# Patient Record
Sex: MALE | Race: WHITE | NOT HISPANIC OR LATINO | Employment: FULL TIME | URBAN - METROPOLITAN AREA
[De-identification: names, ages, dates, MRNs, and addresses within clinical notes are randomized per-mention and may not be internally consistent; named-entity substitution may affect disease eponyms.]

---

## 2018-11-27 DIAGNOSIS — B00.9 HERPES SIMPLEX VIRUS INFECTION: ICD-10-CM

## 2018-11-27 NOTE — TELEPHONE ENCOUNTER
"Requested Prescriptions   Pending Prescriptions Disp Refills     valACYclovir (VALTREX) 500 MG tablet  Last Written Prescription Date:  5/27/16  Last Fill Quantity: 90,  # refills: 0   Last office visit: 9/15/2015 with prescribing provider:  Jackie   Future Office Visit: none     90 tablet 0     Sig: Take 1 tablet (500 mg) by mouth daily    Antivirals for Herpes Protocol Failed    11/27/2018  5:06 PM       Failed - Normal serum creatinine on file in past 12 months    Recent Labs   Lab Test  08/15/14   1146   CR  0.88            Passed - Patient is age 12 or older       Passed - Recent (12 mo) or future (30 days) visit within the authorizing provider's specialty    Patient had office visit in the last 12 months or has a visit in the next 30 days with authorizing provider or within the authorizing provider's specialty.  See \"Patient Info\" tab in inbasket, or \"Choose Columns\" in Meds & Orders section of the refill encounter.                "

## 2018-11-29 RX ORDER — VALACYCLOVIR HYDROCHLORIDE 500 MG/1
500 TABLET, FILM COATED ORAL DAILY
Qty: 90 TABLET | Refills: 0 | Status: SHIPPED | OUTPATIENT
Start: 2018-11-29 | End: 2018-11-30

## 2018-11-29 NOTE — TELEPHONE ENCOUNTER
Routing refill request to provider for review/approval because:  Labs not current:  creat  Patient needs to be seen because it has been more than 1 year since last office visit.- last seen 2015

## 2018-11-30 ENCOUNTER — OFFICE VISIT (OUTPATIENT)
Dept: INTERNAL MEDICINE | Facility: CLINIC | Age: 23
End: 2018-11-30
Payer: COMMERCIAL

## 2018-11-30 VITALS
DIASTOLIC BLOOD PRESSURE: 70 MMHG | HEIGHT: 69 IN | TEMPERATURE: 97.9 F | SYSTOLIC BLOOD PRESSURE: 100 MMHG | RESPIRATION RATE: 18 BRPM | BODY MASS INDEX: 31.12 KG/M2 | WEIGHT: 210.1 LBS

## 2018-11-30 DIAGNOSIS — Z11.3 SCREEN FOR STD (SEXUALLY TRANSMITTED DISEASE): ICD-10-CM

## 2018-11-30 DIAGNOSIS — B00.9 HERPES SIMPLEX VIRUS INFECTION: Primary | ICD-10-CM

## 2018-11-30 DIAGNOSIS — Z23 NEED FOR TD VACCINE: ICD-10-CM

## 2018-11-30 PROCEDURE — 90471 IMMUNIZATION ADMIN: CPT | Performed by: INTERNAL MEDICINE

## 2018-11-30 PROCEDURE — 99202 OFFICE O/P NEW SF 15 MIN: CPT | Mod: 25 | Performed by: INTERNAL MEDICINE

## 2018-11-30 PROCEDURE — 90714 TD VACC NO PRESV 7 YRS+ IM: CPT | Performed by: INTERNAL MEDICINE

## 2018-11-30 PROCEDURE — 87591 N.GONORRHOEAE DNA AMP PROB: CPT | Performed by: INTERNAL MEDICINE

## 2018-11-30 PROCEDURE — 87491 CHLMYD TRACH DNA AMP PROBE: CPT | Performed by: INTERNAL MEDICINE

## 2018-11-30 RX ORDER — VALACYCLOVIR HYDROCHLORIDE 500 MG/1
500 TABLET, FILM COATED ORAL DAILY
Qty: 90 TABLET | Refills: 3 | Status: SHIPPED | OUTPATIENT
Start: 2018-11-30 | End: 2019-12-07

## 2018-11-30 RX ORDER — VALACYCLOVIR HYDROCHLORIDE 1 G/1
1000 TABLET, FILM COATED ORAL DAILY
Qty: 90 TABLET | Refills: 3 | Status: CANCELLED | OUTPATIENT
Start: 2018-11-30

## 2018-11-30 NOTE — NURSING NOTE
"Vital signs:  Temp: 97.9  F (36.6  C) Temp src: Oral BP: 100/70     Resp: 18       Height: 5' 8.5\" (174 cm) Weight: 210 lb 1.6 oz (95.3 kg)  Estimated body mass index is 31.48 kg/(m^2) as calculated from the following:    Height as of this encounter: 5' 8.5\" (1.74 m).    Weight as of this encounter: 210 lb 1.6 oz (95.3 kg).          "

## 2018-11-30 NOTE — PROGRESS NOTES
"  SUBJECTIVE:   Sage Aburto is a 22 year old male who presents to clinic today for the following health issues:      Follow-up for refill of Valtrex for genital herpes.  He reports that he did stop the medication for a while but did have an outbreak again.  He did not realize we had sent a prescription refill earlier in the week so he is not picked up yet.  He has some questions about the effectiveness of the medication.    No other acute concerns.  He reports improved sleep but he is not using any CPAP    Patient Active Problem List   Diagnosis     CARDIOVASCULAR SCREENING; LDL GOAL LESS THAN 160     DASHAWN (obstructive sleep apnea)     Current Outpatient Prescriptions   Medication Sig Dispense Refill     valACYclovir (VALTREX) 500 MG tablet Take 1 tablet (500 mg) by mouth daily 90 tablet 3      Social History   Substance Use Topics     Smoking status: Never Smoker     Smokeless tobacco: Never Used     Alcohol use Yes      Comment: twice a month             ROS:  negativew    OBJECTIVE:     /70 (BP Location: Right arm, Patient Position: Sitting, Cuff Size: Adult Large)  Temp 97.9  F (36.6  C) (Oral)  Resp 18  Ht 5' 8.5\" (1.74 m)  Wt 210 lb 1.6 oz (95.3 kg)  BMI 31.48 kg/m2  Body mass index is 31.48 kg/(m^2).      Not examined.     ASSESSMENT/PLAN:         1. Herpes simplex virus infection  Continue medication, advised that there is low risk of transmission with chronic use but is important to notify potential sexual partners  - valACYclovir (VALTREX) 500 MG tablet; Take 1 tablet (500 mg) by mouth daily  Dispense: 90 tablet; Refill: 3    2. Screen for STD (sexually transmitted disease)  Testing performed today  - NEISSERIA GONORRHOEA PCR  - CHLAMYDIA TRACHOMATIS PCR    3. Need for Td vaccine  Done  - TD (ADULT, 7+) PRESERVE FREE        Anayeli Mejia MD  [facility]    "

## 2018-11-30 NOTE — MR AVS SNAPSHOT
"              After Visit Summary   2018    Sage Aburto    MRN: [id number]           Patient Information     Date Of Birth          1995        Visit Information        Provider Department      2018 9:20 AM Anayeli Mejia MD [facility]        Today's Diagnoses     Herpes simplex virus infection    -  1    Screen for STD (sexually transmitted disease)           Follow-ups after your visit        Who to contact     If you have questions or need follow up information about today's clinic visit or your schedule please contact [facility] directly at [phone].  Normal or non-critical lab and imaging results will be communicated to you by [employer], letter or phone within 4 business days after the clinic has received the results. If you do not hear from us within 7 days, please contact the clinic through [employer] or phone. If you have a critical or abnormal lab result, we will notify you by phone as soon as possible.  Submit refill requests through [employer] or call your pharmacy and they will forward the refill request to us. Please allow 3 business days for your refill to be completed.          Additional Information About Your Visit        MyChart Information     [employer] lets you send messages to your doctor, view your test results, renew your prescriptions, schedule appointments and more. To sign up, go to [email or web address] . Click on \"Log in\" on the left side of the screen, which will take you to the Welcome page. Then click on \"Sign up Now\" on the right side of the page.     You will be asked to enter the access code listed below, as well as some personal information. Please follow the directions to create your username and password.     Your access code is: [email or web address]  Expires: 2019  9:15 AM     Your access code will  in 90 days. If you need help or a new code, please call your [facility] or [phone].        Care EveryWhere ID     " "This is your Care EveryWhere ID. This could be used by other organizations to access your [place] medical records  [id number]        Your Vitals Were     Temperature Respirations Height BMI (Body Mass Index)          97.9  F (36.6  C) (Oral) 18 5' 8.5\" (1.74 m) 31.48 kg/m2         Blood Pressure from Last 3 Encounters:   11/30/18 100/70   08/29/16 125/73   09/15/15 114/62    Weight from Last 3 Encounters:   11/30/18 210 lb 1.6 oz (95.3 kg)   08/29/16 199 lb (90.3 kg)   09/15/15 183 lb (83 kg)              We Performed the Following     CHLAMYDIA TRACHOMATIS PCR     NEISSERIA GONORRHOEA PCR          Today's Medication Changes          These changes are accurate as of 11/30/18 10:00 AM.  If you have any questions, ask your nurse or doctor.               These medicines have changed or have updated prescriptions.        Dose/Directions    valACYclovir 500 MG tablet   Commonly known as:  VALTREX   This may have changed:  Another medication with the same name was removed. Continue taking this medication, and follow the directions you see here.   Used for:  Herpes simplex virus infection   Changed by:  Anayeli Mejia MD        Dose:  500 mg   Take 1 tablet (500 mg) by mouth daily   Quantity:  90 tablet   Refills:  3         Stop taking these medicines if you haven't already. Please contact your care team if you have questions.     modafinil 100 MG tablet   Commonly known as:  PROVIGIL   Stopped by:  Anayeli Mejia MD                Where to get your medicines      These medications were sent to [facility]/pharmacy #[id number] - [facility] [address] [facility]  [address] [address] [facility] [address]     Phone:  [phone]     valACYclovir 500 MG tablet                Primary Care Provider Office Phone # Fax #    Anayeli Mejia [phone]       [address] [facility] [address]  [facility] [address]        Equal Access to Services     PACO GRACE AH: Mika Estrada, milad yeager, rosalba isbell " zane piersonbrianmagui la'aate ah. Marlen [facility] [phone].    ATENCIÓN: Si habla radha, tiene a fuentes disposición servicios gratuitos de asistencia lingüística. Funmilayo al [phone].    We comply with applicable federal civil rights laws and [place] laws. We do not discriminate on the basis of race, color, national origin, age, disability, sex, sexual orientation, or gender identity.            Thank you!     Thank you for choosing [facility]  for your care. Our goal is always to provide you with excellent care. Hearing back from our patients is one way we can continue to improve our services. Please take a few minutes to complete the written survey that you may receive in the mail after your visit with us. Thank you!             Your Updated Medication List - Protect others around you: Learn how to safely use, store and throw away your medicines at www.disposemymeds.org.          This list is accurate as of 11/30/18 10:00 AM.  Always use your most recent med list.                   Brand Name Dispense Instructions for use Diagnosis    valACYclovir 500 MG tablet    VALTREX    90 tablet    Take 1 tablet (500 mg) by mouth daily    Herpes simplex virus infection

## 2018-12-02 LAB
C TRACH DNA SPEC QL NAA+PROBE: NEGATIVE
N GONORRHOEA DNA SPEC QL NAA+PROBE: NEGATIVE
SPECIMEN SOURCE: NORMAL
SPECIMEN SOURCE: NORMAL

## 2019-05-07 ENCOUNTER — OFFICE VISIT (OUTPATIENT)
Dept: INTERNAL MEDICINE | Facility: CLINIC | Age: 24
End: 2019-05-07

## 2019-05-07 VITALS
HEIGHT: 69 IN | RESPIRATION RATE: 16 BRPM | DIASTOLIC BLOOD PRESSURE: 78 MMHG | SYSTOLIC BLOOD PRESSURE: 108 MMHG | OXYGEN SATURATION: 98 % | BODY MASS INDEX: 28.73 KG/M2 | HEART RATE: 77 BPM | WEIGHT: 194 LBS | TEMPERATURE: 98.4 F

## 2019-05-07 DIAGNOSIS — H81.11 BENIGN PAROXYSMAL POSITIONAL VERTIGO OF RIGHT EAR: ICD-10-CM

## 2019-05-07 DIAGNOSIS — G43.919 INTRACTABLE MIGRAINE WITHOUT STATUS MIGRAINOSUS, UNSPECIFIED MIGRAINE TYPE: Primary | ICD-10-CM

## 2019-05-07 PROCEDURE — 99214 OFFICE O/P EST MOD 30 MIN: CPT | Performed by: INTERNAL MEDICINE

## 2019-05-07 RX ORDER — METHYLPREDNISOLONE 4 MG
TABLET, DOSE PACK ORAL
Qty: 21 TABLET | Refills: 0 | Status: SHIPPED | OUTPATIENT
Start: 2019-05-07 | End: 2020-01-27

## 2019-05-07 ASSESSMENT — MIFFLIN-ST. JEOR: SCORE: 1847.42

## 2019-05-07 NOTE — NURSING NOTE
"Vital signs:  Temp: 98.4  F (36.9  C) Temp src: Oral BP: 108/78 Pulse: 77   Resp: 16 SpO2: 98 %     Height: 174 cm (5' 8.5\") Weight: 88 kg (194 lb)  Estimated body mass index is 29.07 kg/m  as calculated from the following:    Height as of this encounter: 1.74 m (5' 8.5\").    Weight as of this encounter: 88 kg (194 lb).          "

## 2019-05-07 NOTE — PROGRESS NOTES
SUBJECTIVE:   Sage Aburto is a 23 year old male who presents to clinic today for the following   health issues:      Headache: Is complaining of headaches for the past 2 months approximately.  This started out intermittent, 2-3 times a week, gradually increasing so they have been daily now for the last 3 or 4 weeks.  Headache is not stabbing or throbbing, it is dull but persistent.  It is the worst in the morning, it decreases very slightly after he is up a little while, otherwise its fairly steady intensity.  It is more frontal, more on the left side of the head and around the left ear but can be diffuse.  At worst at 6/10, most of the time 4/10.  One day he noticed some puffiness of the face but that is not been continuing.  It can be behind the eye but otherwise seems deep in the head.  He does have some neck pain.  No hearing change.  No sinus pain, postnasal drainage or purulent rhinorrhea.  He can get some tingling in his right arm but that is an old problem, otherwise no facial numbness or tingling, double vision, slurred speech, gait disturbance, incoordination.  He has not really had issues at all with headaches in the past.  He does not get auras, does not have significant sensitivity to light or sound.  The headaches do not cause nausea.  Ibuprofen 2 to 4 tablets do not help, Tylenol does not help, Excedrin Migraine does not help    He has lost some weight recently, was up to 260 pounds a year ago.  He has been trying hard to lose weight.       He has had occasional vertigo at night that awakens him.  This is about 3-4 times a week, he can have some visual disturbance with the vertigo as well as nausea.  It goes away after about 5 minutes after sitting up.  He seems to do best lying on his left side.  Vertigo does not seem to be really related to the headaches.        Patient Active Problem List   Diagnosis     CARDIOVASCULAR SCREENING; LDL GOAL LESS THAN 160     DASHAWN (obstructive sleep apnea)     "  Current Outpatient Medications   Medication Sig Dispense Refill     valACYclovir (VALTREX) 500 MG tablet Take 1 tablet (500 mg) by mouth daily 90 tablet 3           Reviewed  and updated as needed this visit by clinical staff  Tobacco  Allergies  Meds  Med Hx  Surg Hx  Fam Hx  Soc Hx        Reviewed and updated as needed this visit by Provider             ROS:  No fever, chills, visual disturbance, facial numbness, tingling, weakness, clumsiness, loss of control of bowel or bladder, just tingling in the right arm, not the left, no ear discharge or hearing change, no sinus pain, postnasal drainage, purulent rhinorrhea, sore throat    OBJECTIVE:     /78   Pulse 77   Temp 98.4  F (36.9  C) (Oral)   Resp 16   Ht 1.74 m (5' 8.5\")   Wt 88 kg (194 lb)   SpO2 98%   BMI 29.07 kg/m    Body mass index is 29.07 kg/m .    PERRL, EOMI, fundi benign  CN 2-12 intact  DTRs 2/4  Strength 5/5  Sensation intact  FNF normal  Gait normal  Tandem normal  Romberg negative  Positive Hallpike with the right ear down  Neck with moderate tenderness knots in the muscles, especially more in the left than the right, good range of motion  No cervical adenopathy or thyromegaly        ASSESSMENT/PLAN:             1. Intractable migraine without status migrainosus, unspecified migraine type  Most it is most likely that he has tension type headache triggering intractable migraine.  It does not appear to be due to sinus disease.  There are no neurologic findings that would indicate tumor.  However this is a new problem, it is worse in the morning and so recommend MRI to rule out other underlying causes, without any thing acute on exam, do not feel this is an emergency however advised that if there is seems to be any acute sudden worsening he should present to the ED..  Will start on a Medrol Dosepak for now.  Then consider possible myofascial therapy for the neck, advised on good ergonomics, heat, stretch.  May need to start on " suppressive treatment.  - methylPREDNISolone (MEDROL DOSEPAK) 4 MG tablet therapy pack; Follow Package Directions  Dispense: 21 tablet; Refill: 0  - MR Brain w/o & w Contrast; Future    2. Benign paroxysmal positional vertigo of right ear  Advised this is benign positional vertigo, is not related to the headache.  Given some handouts with exercises he could try at home.          Anayeli Mejia MD  [facility]

## 2019-05-10 ENCOUNTER — HOSPITAL ENCOUNTER (OUTPATIENT)
Dept: MRI IMAGING | Facility: CLINIC | Age: 24
Discharge: HOME | End: 2019-05-10
Attending: INTERNAL MEDICINE | Admitting: INTERNAL MEDICINE
Payer: COMMERCIAL

## 2019-05-10 DIAGNOSIS — G43.919 INTRACTABLE MIGRAINE WITHOUT STATUS MIGRAINOSUS, UNSPECIFIED MIGRAINE TYPE: ICD-10-CM

## 2019-05-10 PROCEDURE — A9585 GADOBUTROL INJECTION: HCPCS | Performed by: INTERNAL MEDICINE

## 2019-05-10 PROCEDURE — 25500064 ZZH RX 255 OP 636: Performed by: INTERNAL MEDICINE

## 2019-05-10 PROCEDURE — 70553 MRI BRAIN STEM W/O & W/DYE: CPT

## 2019-05-10 RX ORDER — GADOBUTROL 604.72 MG/ML
10 INJECTION INTRAVENOUS ONCE
Status: COMPLETED | OUTPATIENT
Start: 2019-05-10 | End: 2019-05-10

## 2019-05-10 RX ADMIN — GADOBUTROL 9 ML: 604.72 INJECTION INTRAVENOUS at 15:40

## 2019-05-16 ENCOUNTER — MYC MEDICAL ADVICE (OUTPATIENT)
Dept: INTERNAL MEDICINE | Facility: CLINIC | Age: 24
End: 2019-05-16

## 2019-05-16 DIAGNOSIS — G43.019 INTRACTABLE MIGRAINE WITHOUT AURA AND WITHOUT STATUS MIGRAINOSUS: Primary | ICD-10-CM

## 2019-05-17 PROBLEM — G43.019 INTRACTABLE MIGRAINE WITHOUT AURA AND WITHOUT STATUS MIGRAINOSUS: Status: ACTIVE | Noted: 2019-05-17

## 2019-05-17 RX ORDER — SUMATRIPTAN 100 MG/1
TABLET, FILM COATED ORAL
Qty: 18 TABLET | Refills: 5 | Status: SHIPPED | OUTPATIENT
Start: 2019-05-17 | End: 2021-12-15 | Stop reason: SINTOL

## 2019-10-02 ENCOUNTER — HEALTH MAINTENANCE LETTER (OUTPATIENT)
Age: 24
End: 2019-10-02

## 2020-01-27 ENCOUNTER — OFFICE VISIT (OUTPATIENT)
Dept: INTERNAL MEDICINE | Facility: CLINIC | Age: 25
End: 2020-01-27

## 2020-01-27 VITALS
HEART RATE: 60 BPM | RESPIRATION RATE: 16 BRPM | HEIGHT: 69 IN | DIASTOLIC BLOOD PRESSURE: 85 MMHG | TEMPERATURE: 98.1 F | WEIGHT: 194.1 LBS | OXYGEN SATURATION: 97 % | BODY MASS INDEX: 28.75 KG/M2 | SYSTOLIC BLOOD PRESSURE: 132 MMHG

## 2020-01-27 DIAGNOSIS — B00.9 HERPES SIMPLEX VIRUS INFECTION: Primary | ICD-10-CM

## 2020-01-27 PROCEDURE — 99214 OFFICE O/P EST MOD 30 MIN: CPT | Mod: 25 | Performed by: INTERNAL MEDICINE

## 2020-01-27 PROCEDURE — 90471 IMMUNIZATION ADMIN: CPT | Performed by: INTERNAL MEDICINE

## 2020-01-27 PROCEDURE — 90682 RIV4 VACC RECOMBINANT DNA IM: CPT | Performed by: INTERNAL MEDICINE

## 2020-01-27 ASSESSMENT — MIFFLIN-ST. JEOR: SCORE: 1842.87

## 2020-01-27 NOTE — NURSING NOTE
"/85 (BP Location: Left arm, Patient Position: Sitting, Cuff Size: Adult Regular)   Pulse 60   Temp 98.1  F (36.7  C) (Oral)   Resp 16   Ht 1.74 m (5' 8.5\")   Wt 88 kg (194 lb 1.6 oz)   SpO2 97%   BMI 29.08 kg/m      "

## 2020-01-27 NOTE — PROGRESS NOTES
"Subjective     Sage Aburto is a 24 year old male who presents to clinic today for the following health issues:    HPI   Is here for follow-up of herpes simplex: He has a lot of questions regarding the virus, transmission.  He has a partner who has not had any known herpes and she has a lot of questions and concerns about this.  He has been on suppressive therapy for a few years and has not had any outbreaks while taking suppression.          Patient Active Problem List   Diagnosis     CARDIOVASCULAR SCREENING; LDL GOAL LESS THAN 160     DASHAWN (obstructive sleep apnea)     Intractable migraine without aura and without status migrainosus     Current Outpatient Medications   Medication Sig Dispense Refill     valACYclovir (VALTREX) 500 MG tablet TAKE 1 TABLET BY MOUTH EVERY DAY 90 tablet 3     SUMAtriptan (IMITREX) 100 MG tablet 1 for migraine, may repeat after 2 hours (Patient not taking: Reported on 1/27/2020) 18 tablet 5      Social History     Tobacco Use     Smoking status: Never Smoker     Smokeless tobacco: Never Used   Substance Use Topics     Alcohol use: Yes     Comment: twice a month     Drug use: No      Reviewed and updated as needed this visit by Provider         Review of Systems   negative      Objective    /85 (BP Location: Left arm, Patient Position: Sitting, Cuff Size: Adult Regular)   Pulse 60   Temp 98.1  F (36.7  C) (Oral)   Resp 16   Ht 1.74 m (5' 8.5\")   Wt 88 kg (194 lb 1.6 oz)   SpO2 97%   BMI 29.08 kg/m    Body mass index is 29.08 kg/m .  Physical Exam     Not examined.           Assessment & Plan     1. Herpes simplex virus infection  Advised about the herpes simplex virus, that the strains are not necessarily limited to just genital or oral, discussed transmission of the virus.  Overall advised he is likely to be fairly low risk since he has been on suppression therapy for some time without having any outbreaks.  Advised it would not be much need to do any further antibody " testing since he had a positive test in the past and has had characteristic lesions in the past.  Advised to try to be open, make sure if he feels any tingling, burning or other sensations near he has had his previous herpes that he abstain from skin contact until he knows whether it truly is an outbreak or not.    27 minutes spent with the patient, >50% of time spent counseling about the herpes simplex viruses, transmission, suppression    No follow-ups on file.    Anayeli Mejia MD  [facility]

## 2021-01-15 ENCOUNTER — HEALTH MAINTENANCE LETTER (OUTPATIENT)
Age: 26
End: 2021-01-15

## 2021-01-22 DIAGNOSIS — B00.9 HERPES SIMPLEX VIRUS INFECTION: ICD-10-CM

## 2021-01-22 RX ORDER — VALACYCLOVIR HYDROCHLORIDE 500 MG/1
TABLET, FILM COATED ORAL
Qty: 90 TABLET | Refills: 3 | Status: SHIPPED | OUTPATIENT
Start: 2021-01-22 | End: 2022-07-29

## 2021-01-22 NOTE — TELEPHONE ENCOUNTER
Pending Prescriptions:                       Disp   Refills    valACYclovir (VALTREX) 500 MG tablet [Phar*90 tab*3        Sig: TAKE 1 TABLET BY MOUTH EVERY DAY    Routing refill request to provider for review/approval because:  Patient fails protocol

## 2021-09-04 ENCOUNTER — HEALTH MAINTENANCE LETTER (OUTPATIENT)
Age: 26
End: 2021-09-04

## 2021-10-12 ENCOUNTER — MYC MEDICAL ADVICE (OUTPATIENT)
Dept: INTERNAL MEDICINE | Facility: CLINIC | Age: 26
End: 2021-10-12

## 2021-10-12 DIAGNOSIS — G47.10 HYPERSOMNOLENCE: Primary | ICD-10-CM

## 2021-10-12 DIAGNOSIS — G47.33 OSA (OBSTRUCTIVE SLEEP APNEA): ICD-10-CM

## 2022-02-16 ASSESSMENT — SLEEP AND FATIGUE QUESTIONNAIRES
HOW LIKELY ARE YOU TO NOD OFF OR FALL ASLEEP WHILE LYING DOWN TO REST IN THE AFTERNOON WHEN CIRCUMSTANCES PERMIT: HIGH CHANCE OF DOZING
HOW LIKELY ARE YOU TO NOD OFF OR FALL ASLEEP WHILE SITTING QUIETLY AFTER LUNCH WITHOUT ALCOHOL: HIGH CHANCE OF DOZING
HOW LIKELY ARE YOU TO NOD OFF OR FALL ASLEEP WHILE SITTING AND TALKING TO SOMEONE: MODERATE CHANCE OF DOZING
HOW LIKELY ARE YOU TO NOD OFF OR FALL ASLEEP IN A CAR, WHILE STOPPED FOR A FEW MINUTES IN TRAFFIC: MODERATE CHANCE OF DOZING
HOW LIKELY ARE YOU TO NOD OFF OR FALL ASLEEP WHILE SITTING AND READING: HIGH CHANCE OF DOZING
HOW LIKELY ARE YOU TO NOD OFF OR FALL ASLEEP WHEN YOU ARE A PASSENGER IN A CAR FOR AN HOUR WITHOUT A BREAK: HIGH CHANCE OF DOZING
HOW LIKELY ARE YOU TO NOD OFF OR FALL ASLEEP WHILE SITTING INACTIVE IN A PUBLIC PLACE: HIGH CHANCE OF DOZING
HOW LIKELY ARE YOU TO NOD OFF OR FALL ASLEEP WHILE WATCHING TV: HIGH CHANCE OF DOZING

## 2022-02-17 ENCOUNTER — VIRTUAL VISIT (OUTPATIENT)
Dept: SLEEP MEDICINE | Facility: CLINIC | Age: 27
End: 2022-02-17
Payer: COMMERCIAL

## 2022-02-17 DIAGNOSIS — G47.33 OSA (OBSTRUCTIVE SLEEP APNEA): ICD-10-CM

## 2022-02-17 DIAGNOSIS — G47.10 HYPERSOMNOLENCE: Primary | ICD-10-CM

## 2022-02-17 PROCEDURE — 99205 OFFICE O/P NEW HI 60 MIN: CPT | Mod: GT | Performed by: PHYSICIAN ASSISTANT

## 2022-02-17 ASSESSMENT — PAIN SCALES - GENERAL: PAINLEVEL: NO PAIN (0)

## 2022-02-17 NOTE — PROGRESS NOTES
Tomas is a 26 year old who is being evaluated via a billable video visit.      How would you like to obtain your AVS? MyChart  If the video visit is dropped, the invitation should be resent by: Text to cell phone: [phone]   Will anyone else be joining your video visit? No    Medication and allergies have been reviewed.   Pt had no vitals to report.       MANDA Mccallum        Video-Visit Details    Type of service:  Video Visit    Video Start Time: 8:31AM    Video End Time:9:05AM    Originating Location (pt. Location): Home    Distant Location (provider location):  [facility] SLEEP [facility]     Platform used for Video Visit: [email or web address]

## 2022-02-17 NOTE — PATIENT INSTRUCTIONS
"          MY TREATMENT INFORMATION FOR SLEEP APNEA-  Sage DEVI Criselda    Am I having a sleep study at a sleep center?  --->Due to normal delays, you will be contacted within 2-4 weeks to schedule  Frequently asked questions:  1. What is Obstructive Sleep Apnea (DASHAWN)? DASHAWN is the most common type of sleep apnea. Apnea means, \"without breath.\"  Apnea is most often caused by narrowing or collapse of the upper airway as muscles relax during sleep.   Almost everyone has occasional apneas. Most people with sleep apnea have had brief interruptions at night frequently for many years.  The severity of sleep apnea is related to how frequent and severe the events are.   2. What are the consequences of DASHAWN? Symptoms include: feeling sleepy during the day, snoring loudly, gasping or stopping of breathing, trouble sleeping, and occasionally morning headaches or heartburn at night.  Sleepiness can be serious and even increase the risk of falling asleep while driving. Other health consequences may include development of high blood pressure and other cardiovascular disease in persons who are susceptible. Untreated DASHAWN  can contribute to heart disease, stroke and diabetes.   3. What are the treatment options? In most situations, sleep apnea is a lifelong disease that must be managed with daily therapy. Medications are not effective for sleep apnea and surgery is generally not considered until other therapies have been tried. Your treatment is your choice . Continuous Positive Airway (CPAP) works right away and is the therapy that is effective in nearly everyone. An oral device to hold your jaw forward is usually the next most reliable option. Other options include postioning devices (to keep you off your back), weight loss, and surgery including a tongue pacing device. There is more detail about some of these options below.  4. Are my sleep studies covered by insurance? Although we will request verification of coverage, we advise you " also check in advance of the study to ensure there is coverage.    Important tips for those choosing CPAP and similar devices   Know your equipment:  CPAP is continuous positive airway pressure that prevents obstructive sleep apnea by keeping the throat from collapsing while you are sleeping. In most cases, the device is  smart  and can slowly self-adjusts if your throat collapses and keeps a record every day of how well you are treated-this information is available to you and your care team.  BPAP is bilevel positive airway pressure that keeps your throat open and also assists each breath with a pressure boost to maintain adequate breathing.  Special kinds of BPAP are used in patients who have inadequate breathing from lung or heart disease. In most cases, the device is  smart  and can slowly self-adjusts to assist breathing. Like CPAP, the device keeps a record of how well you are treated.  Your mask is your connection to the device. You get to choose what feels most comfortable and the staff will help to make sure if fits. Here: are some examples of the different masks that are available:       Key points to remember on your journey with sleep apnea:  1. Sleep study.  PAP devices often need to be adjusted during a sleep study to show that they are effective and adjusted right.  2. Good tips to remember: Try wearing just the mask during a quiet time during the day so your body adapts to wearing it. A humidifier is recommended for comfort in most cases to prevent drying of your nose and throat. Allergy medication from your provider may help you if you are having nasal congestion.  3. Getting settled-in. It takes more than one night for most of us to get used to wearing a mask. Try wearing just the mask during a quiet time during the day so your body adapts to wearing it. A humidifier is recommended for comfort in most cases. Our team will work with you carefully on the first day and will be in contact within 4 days  and again at 2 and 4 weeks for advice and remote device adjustments. Your therapy is evaluated by the device each day.   4. Use it every night. The more you are able to sleep naturally for 7-8 hours, the more likely you will have good sleep and to prevent health risks or symptoms from sleep apnea. Even if you use it 4 hours it helps. Occasionally all of us are unable to use a medical therapy, in sleep apnea, it is not dangerous to miss one night.   5. Communicate. Call our skilled team on the number provided on the first day if your visit for problems that make it difficult to wear the device. Over 2 out of 3 patients can learn to wear the device long-term with help from our team. Remember to call our team or your sleep providers if you are unable to wear the device as we may have other solutions for those who cannot adapt to mask CPAP therapy. It is recommended that you sleep your sleep provider within the first 3 months and yearly after that if you are not having problems.   6. Use it for your health. We encourage use of CPAP masks during daytime quiet periods to allow your face and brain to adapt to the sensation of CPAP so that it will be a more natural sensation to awaken to at night or during naps. This can be very useful during the first few weeks or months of adapting to CPAP though it does not help medically to wear CPAP during wakefulness and  should not be used as a strategy just to meet guidelines.  7. Take care of your equipment. Make sure you clean your mask and tubing using directions every day and that your filter and mask are replaced as recommended or if they are not working.     BESIDES CPAP, WHAT OTHER THERAPIES ARE THERE?    Positioning Device  Positioning devices are generally used when sleep apnea is mild and only occurs on your back.This example shows a pillow that straps around the waist. It may be appropriate for those whose sleep study shows milder sleep apnea that occurs primarily when  lying flat on one's back. Preliminary studies have shown benefit but effectiveness at home may need to be verified by a home sleep test. These devices are generally not covered by medical insurance.  Examples of devices that maintain sleeping on the back to prevent snoring and mild sleep apnea.    Belt type body positioner  http://emotion.me.com/    Electronic reminder  http://nightshifttherapy.com/  http://[email or web address].au/      Oral Appliance  What is oral appliance therapy?  An oral appliance device fits on your teeth at night like a retainer used after having braces. The device is made by a specialized dentist and requires several visits over 1-2 months before a manufactured device is made to fit your teeth and is adjusted to prevent your sleep apnea. Once an oral device is working properly, snoring should be improved. A home sleep test may be recommended at that time if to determine whether the sleep apnea is adequately treated.       Some things to remember:  -Oral devices are often, but not always, covered by your medical insurance. Be sure to check with your insurance provider.   -If you are referred for oral therapy, you will be given a list of specialized dentists to consider or you may choose to visit the Web site of the American Academy of Dental Sleep Medicine  -Oral devices are less likely to work if you have severe sleep apnea or are extremely overweight.     More detailed information  An oral appliance is a small acrylic device that fits over the upper and lower teeth  (similar to a retainer or a mouth guard). This device slightly moves jaw forward, which moves the base of the tongue forward, opens the airway, improves breathing for effective treat snoring and obstructive sleep apnea in perhaps 7 out of 10 people .  The best working devices are custom-made by a dental device  after a mold is made of the teeth 1, 2, 3.  When is an oral appliance indicated?  Oral appliance therapy is  recommended as a first-line treatment for patients with primary snoring, mild sleep apnea, and for patients with moderate sleep apnea who prefer appliance therapy to use of CPAP4, 5. Severity of sleep apnea is determined by sleep testing and is based on the number of respiratory events per hour of sleep.   How successful is oral appliance therapy?  The success rate of oral appliance therapy in patients with mild sleep apnea is 75-80% while in patients with moderate sleep apnea it is 50-70%. The chance of success in patients with severe sleep apnea is 40-50%. The research also shows that oral appliances have a beneficial effect on the cardiovascular health of DASHAWN patients at the same magnitude as CPAP therapy7.  Oral appliances should be a second-line treatment in cases of severe sleep apnea, but if not completely successful then a combination therapy utilizing CPAP plus oral appliance therapy may be effective. Oral appliances tend to be effective in a broad range of patients although studies show that the patients who have the highest success are females, younger patients, those with milder disease, and less severe obesity. 3, 6.   Finding a dentist that practices dental sleep medicine  Specific training is available through the American Academy of Dental Sleep Medicine for dentists interested in working in the field of sleep. To find a dentist who is educated in the field of sleep and the use of oral appliances, near you, visit the Web site of the American Academy of Dental Sleep Medicine.    References  1. Shon et al. Objectively measured vs self-reported compliance during oral appliance therapy for sleep-disordered breathing. Chest 2013; 144(5): 7763-1060.  2. Kenzie et al. Objective measurement of compliance during oral appliance therapy for sleep-disordered breathing. Thorax 2013; 68(1): 91-96.  3. Sebastian et al. Mandibular advancement devices in 620 men and women with DASHAWN and snoring:  tolerability and predictors of treatment success. Chest 2004; 125: 3181-2638.  4. Robles et al. Oral appliances for snoring and DASHAWN: a review. Sleep 2006; 29: 244-262.  5. Becky et al. Oral appliance treatment for DASHAWN: an update. J Clin Sleep Med 2014; 10(2): 215-227.  6. Eileen et al. Predictors of OSAH treatment outcome. J Dent Res 2007; 86: 2529-9794.      Weight Loss:    Weight loss is a long-term strategy that may improve sleep apnea in some patients.    Weight management is a personal decision and the decision should be based on your interest and the potential benefits.  If you are interested in exploring weight loss strategies, the following discussion covers the impact on weight loss on sleep apnea and the approaches that may be successful.    Being overweight does not necessarily mean you will have health consequences.  Those who have BMI over 35 or over 27 with existing medical conditions carries greater risk.   Weight loss decreases severity of sleep apnea in most people with obesity. For those with mild obesity who have developed snoring with weight gain, even 15-30 pound weight loss can improve and occasionally eliminate sleep apnea.  Structured and life-long dietary and health habits are necessary to lose weight and keep healthier weight levels.     Though there may be significant health benefits from weight loss, long-term weight loss is very difficult to achieve- studies show success with dietary management in less than 10% of people. In addition, substantial weight loss may require years of dietary control and may be difficult if patients have severe obesity. In these cases, surgical management may be considered.  Finally, older individuals who have tolerated obesity without health complications may be less likely to benefit from weight loss strategies.        Your BMI is There is no height or weight on file to calculate BMI.  Weight management is a personal decision.  If you are  interested in exploring weight loss strategies, the following discussion covers the approaches that may be successful. Body mass index (BMI) is one way to tell whether you are at a healthy weight, overweight, or obese. It measures your weight in relation to your height.  A BMI of 18.5 to 24.9 is in the healthy range. A person with a BMI of 25 to 29.9 is considered overweight, and someone with a BMI of 30 or greater is considered obese. More than two-thirds of American adults are considered overweight or obese.  Being overweight or obese increases the risk for further weight gain. Excess weight may lead to heart disease and diabetes.  Creating and following plans for healthy eating and physical activity may help you improve your health.  Weight control is part of healthy lifestyle and includes exercise, emotional health, and healthy eating habits. Careful eating habits lifelong are the mainstay of weight control. Though there are significant health benefits from weight loss, long-term weight loss with diet alone may be very difficult to achieve- studies show long-term success with dietary management in less than 10% of people. Attaining a healthy weight may be especially difficult to achieve in those with severe obesity. In some cases, medications, devices and surgical management might be considered.  What can you do?  If you are overweight or obese and are interested in methods for weight loss, you should discuss this with your provider.     Consider reducing daily calorie intake by 500 calories.     Keep a food journal.     Avoiding skipping meals, consider cutting portions instead.    Diet combined with exercise helps maintain muscle while optimizing fat loss. Strength training is particularly important for building and maintaining muscle mass. Exercise helps reduce stress, increase energy, and improves fitness. Increasing exercise without diet control, however, may not burn enough calories to loose weight.        Start walking three days a week 10-20 minutes at a time    Work towards walking thirty minutes five days a week     Eventually, increase the speed of your walking for 1-2 minutes at time    And look into health and wellness programs that may be available through your health insurance provider, employer, local community center, or siri club.          Surgery:    Surgery for obstructive sleep apnea is considered generally only when other therapies fail to work. Surgery may be discussed with you if you are having a difficult time tolerating CPAP and or when there is an abnormal structure that requires surgical correction.  Nose and throat surgeries often enlarge the airway to prevent collapse.  Most of these surgeries create pain for 1-2 weeks and up to half of the most common surgeries are not effective throughout life.  You should carefully discuss the benefits and drawbacks to surgery with your sleep provider and surgeon to determine if it is the best solution for you.   More information  Surgery for DASHAWN is directed at areas that are responsible for narrowing or complete obstruction of the airway during sleep.  There are a wide range of procedures available to enlarge and/or stabilize the airway to prevent blockage of breathing in the three major areas where it can occur: the palate, tongue, and nasal regions.  Successful surgical treatment depends on the accurate identification of the factors responsible for obstructive sleep apnea in each person.  A personalized approach is required because there is no single treatment that works well for everyone.  Because of anatomic variation, consultation with an examination by a sleep surgeon is a critical first step in determining what surgical options are best for each patient.  In some cases, examination during sedation may be recommended in order to guide the selection of procedures.  Patients will be counseled about risks and benefits as well as the typical  recovery course after surgery. Surgery is typically not a cure for a person s DASHAWN.  However, surgery will often significantly improve one s DASHAWN severity (termed  success rate ).  Even in the absence of a cure, surgery will decrease the cardiovascular risk associated with OSA7; improve overall quality of life8 (sleepiness, functionality, sleep quality, etc).      Palate Procedures:  Patients with DASHAWN often have narrowing of their airway in the region of their tonsils and uvula.  The goals of palate procedures are to widen the airway in this region as well as to help the tissues resist collapse.  Modern palate procedure techniques focus on tissue conservation and soft tissue rearrangement, rather than tissue removal.  Often the uvula is preserved in this procedure. Residual sleep apnea is common in patient after pharyngoplasty with an average reduction in sleep apnea events of 33%2.      Tongue Procedures:  ExamWhile patients are awake, the muscles that surround the throat are active and keep this region open for breathing. These muscles relax during sleep, allowing the tongue and other structures to collapse and block breathing.  There are several different tongue procedures available.  Selection of a tongue base procedure depends on characteristics seen on physical exam.  Generally, procedures are aimed at removing bulky tissues in this area or preventing the back of the tongue from falling back during sleep.  Success rates for tongue surgery range from 50-62%3.    Hypoglossal Nerve Stimulation:  Hypoglossal nerve stimulation has recently received approval from the [place] Food and Drug Administration for the treatment of obstructive sleep apnea.  This is based on research showing that the system was safe and effective in treating sleep apnea6.  Results showed that the median AHI score decreased 68%, from 29.3 to 9.0. This therapy uses an implant system that senses breathing patterns and delivers mild  stimulation to airway muscles, which keeps the airway open during sleep.  The system consists of three fully implanted components: a small generator (similar in size to a pacemaker), a breathing sensor, and a stimulation lead.  Using a small handheld remote, a patient turns the therapy on before bed and off upon awakening.    Candidates for this device must be greater than 22 years of age, have moderate to severe DASHAWN (AHI between 20-65), BMI less than 32, have tried CPAP/oral appliance without success, and have appropriate upper airway anatomy (determined by a sleep endoscopy performed by Dr. Bennett).    Hypoglossal Nerve Stimulation Pathway:    The sleep surgeon s office will work with the patient through the insurance prior-authorization process (including communications and appeals).    Nasal Procedures:  Nasal obstruction can interfere with nasal breathing during the day and night.  Studies have shown that relief of nasal obstruction can improve the ability of some patients to tolerate positive airway pressure therapy for obstructive sleep apnea1.  Treatment options include medications such as nasal saline, topical corticosteroid and antihistamine sprays, and oral medications such as antihistamines or decongestants. Non-surgical treatments can include external nasal dilators for selected patients. If these are not successful by themselves, surgery can improve the nasal airway either alone or in combination with these other options.      Combination Procedures:  Combination of surgical procedures and other treatments may be recommended, particularly if patients have more than one area of narrowing or persistent positional disease.  The success rate of combination surgery ranges from 66-80%2,3.    References  1. Alexx TORRES. The Role of the Nose in Snoring and Obstructive Sleep Apnoea: An Update.  Eur Arch Otorhinolaryngol. 2011; 268: 1365-73.  2.  Eda SM; Rosalba JA; Karlie JR; Pallanch JF; Leandro ROCHE; Misha SG;  Joi LOPEZ. Surgical modifications of the upper airway for obstructive sleep apnea in adults: a systematic review and meta-analysis. SLEEP 2010;33(10):1944-6479. Christy HECK. Hypopharyngeal surgery in obstructive sleep apnea: an evidence-based medicine review.  Arch Otolaryngol Head Neck Surg. 2006 Feb;132(2):206-13.  3. Josué YH1, Dieter Y, [place] JAROD. The efficacy of anatomically based multilevel surgery for obstructive sleep apnea. Otolaryngol Head Neck Surg. 2003 Oct;129(4):327-35.  4. Christy HECK, Goldberg A. Hypopharyngeal Surgery in Obstructive Sleep Apnea: An Evidence-Based Medicine Review. Arch Otolaryngol Head Neck Surg. 2006 Feb;132(2):206-13.  5. Nian PICKARD et al. Upper-Airway Stimulation for Obstructive Sleep Apnea.  N Engl J Med. 2014 Jan 9;370(2):139-49.  6. Ileana Y et al. Increased Incidence of Cardiovascular Disease in Middle-aged Men with Obstructive Sleep Apnea. Am J Respir Crit Care Med; 2002 166: 159-165  7. Moralez EM et al. Studying Life Effects and Effectiveness of Palatopharyngoplasty (SLEEP) study: Subjective Outcomes of Isolated Uvulopalatopharyngoplasty. Otolaryngol Head Neck Surg. 2011; 144: 623-631.        WHAT IF I ONLY HAVE SNORING?      Mandibular advancement devices, lateral sleep positioning, long-term weight loss and treatment of nasal allergies have been shown to improve snoring.    Exercising tongue muscles with a game (https://www.ncbi.nlm.nih.gov/pubmed/23342869) or stimulating the tongue during the day with a device (https://doi.org/10.3390/bkv06728082) have improved snoring in some individuals.    Remember to Drive Safe... Drive Alive     Sleep health profoundly affects your health, mood, and your safety.  Thirty three percent of the population (one in three of us) is not getting enough sleep and many have a sleep disorder. Not getting enough sleep or having an untreated / undertreated sleep condition may make us sleepy without even knowing it. In fact, our driving could be  dramatically impaired due to our sleep health. As your provider, here are some things I would like you to know about driving:     Here are some warning signs for impairment and dangerous drowsy driving:              -Having been awake more than 16 hours               -Looking tired               -Eyelid drooping              -Head nodding (it could be too late at this point)              -Driving for more than 30 minutes     Some things you could do to make the driving safer if you are experiencing some drowsiness:              -Stop driving and rest              -Call for transportation              -Make sure your sleep disorder is adequately treated     Some things that have been shown NOT to work when experiencing drowsiness while driving:              -Turning on the radio              -Opening windows              -Eating any  distracting  /  entertaining  foods (e.g., sunflower seeds, candy, or any other)              -Talking on the phone      Your decision may not only impact your life, but also the life of others. Please, remember to drive safe for yourself and all of us.

## 2022-02-19 ENCOUNTER — HEALTH MAINTENANCE LETTER (OUTPATIENT)
Age: 27
End: 2022-02-19

## 2022-02-23 ENCOUNTER — TELEPHONE (OUTPATIENT)
Dept: SLEEP MEDICINE | Facility: CLINIC | Age: 27
End: 2022-02-23
Payer: COMMERCIAL

## 2022-04-25 ENCOUNTER — OFFICE VISIT (OUTPATIENT)
Dept: SLEEP MEDICINE | Facility: CLINIC | Age: 27
End: 2022-04-25
Payer: COMMERCIAL

## 2022-04-25 DIAGNOSIS — G47.10 HYPERSOMNOLENCE: Primary | ICD-10-CM

## 2022-04-25 NOTE — PROGRESS NOTES
Patient presented to clinic for  and demonstration of the actiwatch and sleep log. Patient was set up and instructed use. Patient verbalized understanding and demonstrated set up back to me. Patient will be returning device by 5/9/2022.    Patient was given sleep logs and written instructions for use.

## 2022-05-08 ENCOUNTER — THERAPY VISIT (OUTPATIENT)
Dept: SLEEP MEDICINE | Facility: CLINIC | Age: 27
End: 2022-05-08
Payer: COMMERCIAL

## 2022-05-08 DIAGNOSIS — G47.33 OSA (OBSTRUCTIVE SLEEP APNEA): ICD-10-CM

## 2022-05-08 DIAGNOSIS — G47.10 HYPERSOMNOLENCE: ICD-10-CM

## 2022-05-08 PROCEDURE — 95810 POLYSOM 6/> YRS 4/> PARAM: CPT | Performed by: PSYCHIATRY & NEUROLOGY

## 2022-05-09 ENCOUNTER — THERAPY VISIT (OUTPATIENT)
Dept: SLEEP MEDICINE | Facility: CLINIC | Age: 27
End: 2022-05-09
Payer: COMMERCIAL

## 2022-05-09 ENCOUNTER — DOCUMENTATION ONLY (OUTPATIENT)
Dept: SLEEP MEDICINE | Facility: CLINIC | Age: 27
End: 2022-05-09

## 2022-05-09 DIAGNOSIS — G47.10 HYPERSOMNOLENCE: ICD-10-CM

## 2022-05-09 LAB
AMPHETAMINES UR QL SCN: NORMAL
BARBITURATES UR QL: NORMAL
BENZODIAZ UR QL: NORMAL
CANNABINOIDS UR QL SCN: NORMAL
COCAINE UR QL: NORMAL
ETHANOL UR QL SCN: NORMAL
OPIATES UR QL SCN: NORMAL
PCP UR QL SCN: NORMAL

## 2022-05-09 PROCEDURE — 80307 DRUG TEST PRSMV CHEM ANLYZR: CPT

## 2022-05-09 PROCEDURE — 95805 MULTIPLE SLEEP LATENCY TEST: CPT | Performed by: PSYCHIATRY & NEUROLOGY

## 2022-05-09 NOTE — PATIENT INSTRUCTIONS
[facility]    1. Your sleep study will be reviewed by a sleep physician within the next few days.     2. Please follow up in the sleep clinic as scheduled, or, make an appointment with your sleep provider to be seen within two weeks to discuss the results of the sleep study.    3. If you have any questions or problems with your treatment plan, please contact your sleep clinic provider at [phone] to further manage your condition.    4. Please review your attached medication list, and, at your follow-up appointment advise your sleep clinic provider about any changes.    5. Go to [email or web address] for more information about your sleep problems.    Anna Hernandez, RPSGT  May 9, 2022

## 2022-05-09 NOTE — PROGRESS NOTES
MSLT study performed after an overnight PSG (  ). Lights on occurred at 0739. Patient describes night as normal.    Nap 1: Patient states that he is tired a 6-7 on a scale of 1-10. Lights out occurred at 0921 in epoch 1. Sleep onset occurred in epoch 32 after 16 minutes. No REM was observed. Patient felt like he slept did not remember dreaming and felt slightly worse than before he the nap started.    Nap 2: Patient states that he feels tired but better than previous nap scoring a 4on a scale of 1-10. Lights out occurred at 1120 in epoch 240. Sleep onset occurred in epoch 244 after 2 minutes.Patient felt like he slept but did not dream he stated that at one point he startled awake and felt dizzy for a moment. No REM was observed. Patient stated he felt slightly better at the end of the nap period.    Nap 3: Patient states that he is not very tired feels like about a 3 on a scale of 1-10. Lights out occurred at 122 in epoch 483. Sleep onset occurred in epoch 487 after 2 minutes. No REM was observed. Patient felt like he blinked and it was done so he believes he slept but did not dream and does feel refreashed after the nap.    Nap 4: Patient states that he is a little tired states a 4-5 out of 10. Lights out occurred at 310 in epoch 699. Sleep onset occurred in epoch 708 after 4.5 minutes. No REM was observed. Patient felt like he fell asleep but did not dream and felt the same as he did before the nap.

## 2022-05-09 NOTE — PROGRESS NOTES
Actigraphy watch and sleep log was reutrned.  Actigraphy report and sleep log was scanned in to Epic.

## 2022-05-12 NOTE — PROCEDURES
" MSLT REPORT          Patient Name: MARYBETH MCDERMOTT Study Date: 5/9/2022   YOB: 1995 Study Type: MSLT   Age:  26 year MRN: [id number]   Sex: Male Interp Physician: noel   BMI:  28.4 Ordering Physician: Leta Warner PA-C   Height: 5' 8\" Referring Physician: -   Weight: 185.0 lbs Recording Tech: R Neppl RPSGT   [place]: 21 Neck Size (cm): 39 cm Scoring Tech: R Neppl RPSGT   Nap Summary       Nap 1 Nap 2 Nap 3 Nap 4 Nap 5 Average   Lights Off 09:21:51 AM 11:20:55 AM 01:22:29 PM 03:10:27 PM - -   Lights On 09:52:53 AM 11:38:42 AM 01:40:14 PM 03:30:34 PM - -   Time In Bed 31.0 17.8 17.8 20.1 - 21.7   Sleep Time 6.5 9.5 15.8 10.0 - 10.5   Sleep Efficiency 20.9% 53.4% 89.2% 49.7% - 48.3%   Sleep Onset 09:36:54 AM 11:22:54 AM 01:24:24 PM 03:14:54 PM - -   Sleep Latency 15.1 2.0 1.9 4.5 - 5.8   REM Onset - - - - - -   REM Sleep Onset Latency - - - - - -         Recording / Sleep Tech Comments    This MSLT was recorded after overnight polysomnography -- Lights On at 0739 (TRT = 595 min, TST = 566 min).  Patient was monitored all day and demonstrated compliance with all technologist s instructions.  Sleep onset detected in study periods 1,2,3 and 4.  SOREMPs were not observed in study periods.  Actiwatch was collected and downloaded.  Random UA was collected.    Physician Interpretation/Recommendation  This study demonstrated a mean sleep latency of < 6 minutes without sleep onset REM (nor was sleep onset REM noted on the prior nights PSG).  This study suggests a possible diagnosis of idiopathic hypersomnia.  Recommend optimizing the duration and circadian timing of sleep and if appropriate pharmacological therapy. Clinical correlation required.     Diagnostic Code G47.9        Torrey Tinajero MD 5-12-22  Diplomate, ABPN Sleep Medicine    "

## 2022-05-13 LAB — SLPCOMP: NORMAL

## 2022-05-23 ENCOUNTER — TELEPHONE (OUTPATIENT)
Dept: SLEEP MEDICINE | Facility: CLINIC | Age: 27
End: 2022-05-23

## 2022-05-23 ENCOUNTER — VIRTUAL VISIT (OUTPATIENT)
Dept: SLEEP MEDICINE | Facility: CLINIC | Age: 27
End: 2022-05-23
Payer: COMMERCIAL

## 2022-05-23 VITALS — WEIGHT: 195 LBS | HEIGHT: 68 IN | BODY MASS INDEX: 29.55 KG/M2

## 2022-05-23 DIAGNOSIS — G47.11 IDIOPATHIC HYPERSOMNOLENCE: Primary | ICD-10-CM

## 2022-05-23 PROCEDURE — 99214 OFFICE O/P EST MOD 30 MIN: CPT | Mod: 95 | Performed by: PHYSICIAN ASSISTANT

## 2022-05-23 RX ORDER — MODAFINIL 200 MG/1
200 TABLET ORAL DAILY
Qty: 30 TABLET | Refills: 0 | Status: SHIPPED | OUTPATIENT
Start: 2022-05-23 | End: 2022-06-15

## 2022-05-23 ASSESSMENT — SLEEP AND FATIGUE QUESTIONNAIRES
HOW LIKELY ARE YOU TO NOD OFF OR FALL ASLEEP IN A CAR, WHILE STOPPED FOR A FEW MINUTES IN TRAFFIC: SLIGHT CHANCE OF DOZING
HOW LIKELY ARE YOU TO NOD OFF OR FALL ASLEEP WHILE WATCHING TV: HIGH CHANCE OF DOZING
HOW LIKELY ARE YOU TO NOD OFF OR FALL ASLEEP WHILE SITTING QUIETLY AFTER LUNCH WITHOUT ALCOHOL: MODERATE CHANCE OF DOZING
HOW LIKELY ARE YOU TO NOD OFF OR FALL ASLEEP WHILE LYING DOWN TO REST IN THE AFTERNOON WHEN CIRCUMSTANCES PERMIT: MODERATE CHANCE OF DOZING
HOW LIKELY ARE YOU TO NOD OFF OR FALL ASLEEP WHILE SITTING AND TALKING TO SOMEONE: MODERATE CHANCE OF DOZING
HOW LIKELY ARE YOU TO NOD OFF OR FALL ASLEEP WHEN YOU ARE A PASSENGER IN A CAR FOR AN HOUR WITHOUT A BREAK: HIGH CHANCE OF DOZING
HOW LIKELY ARE YOU TO NOD OFF OR FALL ASLEEP WHILE SITTING INACTIVE IN A PUBLIC PLACE: HIGH CHANCE OF DOZING
HOW LIKELY ARE YOU TO NOD OFF OR FALL ASLEEP WHILE SITTING AND READING: HIGH CHANCE OF DOZING

## 2022-05-23 NOTE — Clinical Note
Hi! I have a new patient on stimulant (modafinil) that needs to be added to stimulant calendar, he is coming into [facility] later today to sign controlled substance agreement and I sent him 1 month with no refills just now to pharmacy

## 2022-05-23 NOTE — PROGRESS NOTES
Tomas is a 26 year old who is being evaluated via a billable video visit.      How would you like to obtain your AVS? MyChart  If the video visit is dropped, the invitation should be resent by: Send to e-mail at: [email or web address]  Will anyone else be joining your video visit? No      Video-Visit Details    Type of service:  Video Visit    Video Start Time: 9:05AM    Video End Time:9:20AM    Originating Location (pt. Location): Home    Distant Location (provider location):  [facility]     Platform used for Video Visit: [employer]

## 2022-05-23 NOTE — TELEPHONE ENCOUNTER
Prior Authorization Specialty Medication Request    Medication/Dose:  Modafinil 200 mg  ICD code (if different than what is on RX):      Insurance Name: [facility]  Insurance ID: [id number]  Insurance Phone Number:     Pharmacy Information (if different than what is on RX)  Name:  [facility]  Phone:  [phone].      KANDY Huynh

## 2022-05-23 NOTE — PROGRESS NOTES
"[facility]   Outpatient Sleep Medicine  May 23, 2022       Name: Sage Aburto MRN# [id number]   Age: 26 year old YOB: 1995          Assessment and Plan:   1. Idiopathic hypersomnolence    During this visit, we reviewed hypersomnolence testing including 2 weeks actigraphy, PSG, MSLT, urine tox results.     Pre-PSG evaluation showed relatively consistent sleep intake of approximately 7-8 hours from 10:30-11PM to roughly 6:30AM.     PSG was unrevealing. No evidence of significant sleep disordered breathing with AHI 2.0, RDI 3.5.  No sustained hypoxemia.  No evidence of sleep-related movement disorder, few PLM's with index 4.2, arousal at 1.9 but likely insignificant. Normal sinus rhythm on cardiac monitoring.  Sleep architecture revealed all sleep stages present with normal duration and normal sleep efficiency 95%.  Sleep latency was decreased at 5 minutes.  REM latency 143.5 minutes.    MSLT completed following day consisted of 4 naps.  Mean sleep latency during these naps 5.8 minutes (nap one 15.1 minutes, nap two 2 minutes, nap three 1.9 minute, nap four 4.5 minutes).  No sleep onset REM.    Urine drug screen negative.     Findings of testing favor diagnosis of idiopathic hypersomnolence. Discussed stimulant therapy is general management strategy to assist with controlling daytime sleepiness. Was on modafinil in the past prescribed by Bennett Goltz PAC and tolerated well, recalls \"it was awesome for me\". Will re-start modafinil 200mg daily in the morning. He will come into clinic to sign controlled substance agreement.     Follow-up in 1-2 months for medication follow-up to ensure modafinil is tolerated and effective.        Chief Complaint      Chief Complaint   Patient presents with     Video Visit     PSG/MSLT follow-up          History of Present Illness:   Sage Aburto is a 26 year old male who presents to the clinic for results of recent sleep testing completed on " 5/8/2022 and 5/9/2022 to evaluate hypersomnia.    Reviewed results of sleep study with patient as follows:   DIAGNOSTIC POLYSOMNOGRAPHY REPORT   Sleep Architecture: Sleep fragmentation. Normal REM latency.   The total recording time of the polysomnogram was 595.5 minutes. The total sleep time was 566.0 minutes. Sleep latency was 5.0 minutes. REM latency was 143.5 minutes. Arousal index was 18.7 arousals per hour. Sleep efficiency was 95.0%. Wake after sleep onset was 24.5 minutes. The patient spent 4.6% of total sleep time in Stage N1, 46.5% in Stage N2, 28.3% in Stage N3, and 20.7% in REM. Time in REM supine was 102.0 minutes.     Respiration: This study did not demonstrate clinically significant sleep disordered breathing.  This was a good study that included REM supine.      Events ? The polysomnogram revealed a presence of - obstructive, 3 central, and - mixed apneas resulting in an apnea index of 0.3 events per hour. There were 16 obstructive hypopneas and - central hypopneas resulting in an obstructive hypopnea index of 1.7 and central hypopnea index of - events per hour. The combined apnea/hypopnea index was 2.0 events per hour (central apnea/hypopnea index was 0.3 events per hour). The REM AHI was 3.1 events per hour. The supine AHI was 3.1 events per hour. The RERA index was 1.5 events per hour.  The RDI was 3.5 events per hour.    Snoring - was reported as minimal.    Respiratory rate and pattern - was notable for normal respiratory rate and pattern.    Sustained Sleep Associated Hypoventilation - Transcutaneous carbon dioxide monitoring was not used.    Sleep Associated Hypoxemia - (Greater than 5 minutes O2 sat at or below 88%) was not present. Baseline oxygen saturation was 93.4%. Lowest oxygen saturation was 85.0%. Time spent less than or equal to 88% was 0.3 minutes. Time spent less than or equal to 89% was 0.5 minutes.     Movement Activity: No evidence of sleep related movement disorder.  "    Periodic Limb Activity - There were 40 PLMs during the entire study. The PLM index was 4.2 movements per hour. The PLM Arousal Index was 1.9 per hour.    REM EMG Activity - Excessive muscle activity was not present.    Nocturnal Behavior - Abnormal sleep related behaviors were not noted.    Bruxism - None apparent.     Cardiac Summary: Sinus  The average pulse rate was 53.8 bpm. The minimum pulse rate was 43.0 bpm while the maximum pulse rate was 95.0 bpm.  Arrhythmias were/were not noted.    MSLT Report:  Physician Interpretation/Recommendation  This study demonstrated a mean sleep latency of < 6 minutes without sleep onset REM (nor was sleep onset REM noted on the prior nights PSG).  This study suggests a possible diagnosis of idiopathic hypersomnia.  Recommend optimizing the duration and circadian timing of sleep and if appropriate pharmacological therapy. Clinical correlation required.     Past medical/surgical history, family history, social history, medications and allergies were reviewed.           Physical Examination:   Ht 1.727 m (5' 8\")   Wt 88.5 kg (195 lb)   BMI 29.65 kg/m    General appearance: Awake, alert, cooperative. Well groomed. Sitting comfortably in chair. In no apparent distress.  HEENT: Head: Normocephalic, atraumatic. Eyes:Conjunctiva clear. Sclera normal. Nose: External appearance without deformity.   Pulmonary:  Able to speak easily in full sentences. No cough or wheeze.   Skin:  No rashes or significant lesions on visible skin.   Neurologic: Alert, oriented x3.   Psychiatric: Mood euthymic. Affect congruent with full range and intensity.      CC:  Anayeli Mejia PA-C  May 23, 2022     [facility]  [address] [place] [address]     [facility]  [address] [address]    Chart documentation was completed, in part, with [employer] voice-recognition software. Even though reviewed, some " grammatical, spelling, and word errors may remain.      30 minutes spent on day of encounter doing chart review, history and exam, documentation, and further activities as noted above

## 2022-05-26 NOTE — TELEPHONE ENCOUNTER
PA Initiation    Medication: modafinil (PROVIGIL) 200 MG tablet   Insurance Company: Other (see comments)  Pharmacy Filling the Rx: [id number] - [place], [place] - [address]  Filling Pharmacy Phone: [phone]  Filling Pharmacy Fax: [phone]  Start Date: 5/26/2022

## 2022-05-26 NOTE — TELEPHONE ENCOUNTER
Prior Authorization Approval    Authorization Effective Date: 5/26/2022  Authorization Expiration Date: 5/25/2023  Medication: modafinil (PROVIGIL) 200 MG tablet--APPROVED  Approved Dose/Quantity:   Reference #: [id number]   Insurance Company: Other (see comments)  Expected CoPay:       CoPay Card Available:      Foundation Assistance Needed:    Which Pharmacy is filling the prescription (Not needed for infusion/clinic administered): [id number] - [place], [place] - [address]  Pharmacy Notified: Yes  Patient Notified: Yes **Instructed pharmacy to notify patient when script is ready to /ship.**

## 2022-06-15 RX ORDER — MODAFINIL 200 MG/1
200 TABLET ORAL DAILY
Qty: 30 TABLET | Refills: 0 | Status: SHIPPED | OUTPATIENT
Start: 2022-07-25 | End: 2022-09-08

## 2022-06-15 RX ORDER — MODAFINIL 200 MG/1
200 TABLET ORAL DAILY
Qty: 30 TABLET | Refills: 0 | Status: SHIPPED | OUTPATIENT
Start: 2022-08-24 | End: 2022-09-08

## 2022-06-15 RX ORDER — MODAFINIL 200 MG/1
200 TABLET ORAL DAILY
Qty: 30 TABLET | Refills: 0 | Status: SHIPPED | OUTPATIENT
Start: 2022-06-25 | End: 2022-09-08

## 2022-06-15 NOTE — TELEPHONE ENCOUNTER
Pending Prescriptions:                       Disp   Refills    modafinil (PROVIGIL) 200 MG tablet        30 tab*0            Sig: Take 1 tablet (200 mg) by mouth daily    modafinil (PROVIGIL) 200 MG tablet        30 tab*0            Sig: Take 1 tablet (200 mg) by mouth daily    modafinil (PROVIGIL) 200 MG tablet        30 tab*0            Sig: Take 1 tablet (200 mg) by mouth daily          Last Written Prescription Date:  05/26/2022  Last Fill Quantity: 30,   # refills: 0  Last Office Visit: 05/23/2022  Future Office visit:   07/20/2022    Routing refill request to provider for review/approval because:  Drug not on the G, P or [facility] refill protocol or controlled substance

## 2022-07-20 ENCOUNTER — OFFICE VISIT (OUTPATIENT)
Dept: SLEEP MEDICINE | Facility: CLINIC | Age: 27
End: 2022-07-20
Payer: COMMERCIAL

## 2022-07-20 VITALS
SYSTOLIC BLOOD PRESSURE: 133 MMHG | DIASTOLIC BLOOD PRESSURE: 85 MMHG | RESPIRATION RATE: 16 BRPM | OXYGEN SATURATION: 97 % | HEART RATE: 58 BPM | HEIGHT: 68 IN | BODY MASS INDEX: 31.01 KG/M2 | WEIGHT: 204.6 LBS

## 2022-07-20 DIAGNOSIS — G47.11 IDIOPATHIC HYPERSOMNOLENCE: Primary | ICD-10-CM

## 2022-07-20 PROCEDURE — 99214 OFFICE O/P EST MOD 30 MIN: CPT | Performed by: PHYSICIAN ASSISTANT

## 2022-07-20 RX ORDER — MODAFINIL 100 MG/1
100 TABLET ORAL
Qty: 30 TABLET | Refills: 0 | Status: SHIPPED | OUTPATIENT
Start: 2022-07-20 | End: 2022-09-08

## 2022-07-20 NOTE — PROGRESS NOTES
[facility]   Outpatient Sleep Medicine  Jul 20, 2022       Name: Sage Aburto MRN# [id number]   Age: 26 year old YOB: 1995            Assessment and Plan:   1. Idiopathic hypersomnolence  Improvement in sleepiness on modafinil 200mg, overall happy with results but ESS still elevated at 17/24. Discussed options today and agreed to trial of additional 100mg tablet to be taken in late morning/early afternoon. Will let me know how tolerates/any side effects. Plan to see him back in 1-2 months to recheck progress on increased modafinil dose.        Chief Complaint      Chief Complaint   Patient presents with     Sleep Problem     Medication follow up           History of Present Illness:   Sage Aburto is a 26 year old male who presents to the clinic for follow-up of idiopathic hypersomnolence treated with modafinil 200mg.     Summary of hypersomnolence testing completed 5/2022:  Pre-PSG evaluation showed relatively consistent sleep intake of approximately 7-8 hours from 10:30-11:00PM to roughly 6:30AM.      PSG was unrevealing. No evidence of significant sleep disordered breathing with AHI 2.0, RDI 3.5.  No sustained hypoxemia.  No evidence of sleep-related movement disorder, few PLM's with index 4.2, arousal at 1.9. Normal sinus rhythm on cardiac monitoring.  Sleep architecture revealed all sleep stages present with normal duration and normal sleep efficiency 95%.  Sleep latency was decreased at 5 minutes.  REM latency 143.5 minutes.     MSLT completed following day consisted of 4 naps.  Mean sleep latency during these naps 5.8 minutes (nap one 15.1 minutes, nap two 2 minutes, nap three 1.9 minute, nap four 4.5 minutes).  No sleep onset REM.     Urine drug screen negative.     Following this testing re-started on modafinil 200mg. Was previously prescribed modafinil by Bennett Goltz PAC in the past for sleepiness with DASHAWN and tolerated well.     Feels medication is working  "well, felt a distinct change in sleepiness right after starting but now feels efficacy is no longer as obvious, but still improved. Feels \"mental clarity\" on it was significant at first, not so much now. No longer falling asleep in social settings. Cut down significantly on caffeine, now 2 cups coffee per day. Reports some headaches but not daily and not significant. No insomnia, GI concerns, palpitations/tachycardia etc.     Sleep schedule is the same all days of the week with bedtime between 9-10:30PM with <10 minute sleep latency and are typically getting out of bed at 5:30-6:30AM. No nighttime awakenings. Taking the modafinil around 6-6:30AM.     Taking virtually every day, has been a couple days either on a Sunday or on a weekend where he knows he will be drinking alcohol that he does not take it because does not want to mix.     SCALES:    SLEEPINESS: today ESS 17, previously 21/24   INSOMNIA: today VICENTE is 10, previously 19/28    Past medical/surgical history, family history, social history, medications and allergies were reviewed.           Physical Examination:   /85   Pulse 58   Resp 16   Ht 1.727 m (5' 8\")   Wt 92.8 kg (204 lb 9.6 oz)   SpO2 97%   BMI 31.11 kg/m    General appearance: Awake, alert, cooperative. Well groomed. Sitting comfortably in chair. In no apparent distress.  HEENT: Head: Normocephalic, atraumatic. Eyes:Conjunctiva clear. Sclera normal. Remainder of face covered by mask.  Pulmonary:  Able to speak easily in full sentences. No cough or wheeze.   Skin:  No rashes or significant lesions on visible skin.   Neurologic: Alert, oriented x3.   Psychiatric: Mood euthymic. Affect congruent with full range and intensity.    Leta Warner PA-C  Jul 20, 2022      [facility]  [address]  [address], [place] [address]     [facility]  [address], [place] [address]    Chart documentation was completed, in " part, with Dragon voice-recognition software. Even though reviewed, some grammatical, spelling, and word errors may remain.    32 minutes spent on day of encounter doing chart review, history and exam, documentation, and further activities as noted above

## 2022-07-20 NOTE — Clinical Note
Hi! I think I send to you for updates to stimulant calendar right? Tell me if I'm wrong though. For Tomas we will be increasing modafinil to 300mg/day - taking his already prescribed 200mg in AM but adding new script of 100mg to be taken with lunch. Plan to see him back in 1-2 months to re-check.

## 2022-07-20 NOTE — PATIENT INSTRUCTIONS

## 2022-07-20 NOTE — NURSING NOTE
"Chief Complaint   Patient presents with     Sleep Problem     Medication follow up        Initial /85   Pulse 58   Resp 16   Ht 1.727 m (5' 8\")   Wt 92.8 kg (204 lb 9.6 oz)   SpO2 97%   BMI 31.11 kg/m   Estimated body mass index is 31.11 kg/m  as calculated from the following:    Height as of this encounter: 1.727 m (5' 8\").    Weight as of this encounter: 92.8 kg (204 lb 9.6 oz).    Medication Reconciliation: complete    ESS 17  VICENTE 10  Daxa Miranda MA    "

## 2022-07-22 ENCOUNTER — TELEPHONE (OUTPATIENT)
Dept: SLEEP MEDICINE | Facility: CLINIC | Age: 27
End: 2022-07-22

## 2022-07-22 NOTE — TELEPHONE ENCOUNTER
Prior Authorization Specialty Medication Request    Medication/Dose: Modafinil  ICD code (if different than what is on RX):      Insurance Name: [facility]  Insurance ID: [id number]  Insurance Phone Number:     Pharmacy Information (if different than what is on RX)  Name:  [facility]  Phone:  [phone].    KANDY Huynh

## 2022-07-26 NOTE — TELEPHONE ENCOUNTER
Central Prior Authorization Team   Phone: [phone]    PA Initiation    Medication:   Insurance Company:    Pharmacy Filling the Rx: [id number] - [place], [place] - [address]  Filling Pharmacy Phone: [phone]  Filling Pharmacy Fax: [phone]  Start Date: 7/26/2022

## 2022-07-27 NOTE — TELEPHONE ENCOUNTER
Prior Authorization Approval    Authorization Effective Date: 7/26/2022  Authorization Expiration Date: 7/25/2023  Medication: Modafinil - APPROVED  Approved Dose/Quantity:    Reference #: [id number]   Insurance Company:    Expected CoPay:       CoPay Card Available:      Foundation Assistance Needed:    Which Pharmacy is filling the prescription (Not needed for infusion/clinic administered): [id number] [place], [place] [facility] [address]  Pharmacy Notified: Yes  Patient Notified: Yes  PHARMACY STATES REFILLS ARE NEEDED.

## 2022-07-28 DIAGNOSIS — B00.9 HERPES SIMPLEX VIRUS INFECTION: ICD-10-CM

## 2022-07-29 ENCOUNTER — E-VISIT (OUTPATIENT)
Dept: INTERNAL MEDICINE | Facility: CLINIC | Age: 27
End: 2022-07-29
Payer: COMMERCIAL

## 2022-07-29 DIAGNOSIS — B00.9 HERPES SIMPLEX VIRUS INFECTION: Primary | ICD-10-CM

## 2022-07-29 PROCEDURE — 99421 OL DIG E/M SVC 5-10 MIN: CPT | Performed by: INTERNAL MEDICINE

## 2022-07-29 RX ORDER — VALACYCLOVIR HYDROCHLORIDE 500 MG/1
TABLET, FILM COATED ORAL
Qty: 90 TABLET | Refills: 3 | Status: SHIPPED | OUTPATIENT
Start: 2022-07-29 | End: 2023-07-12

## 2022-07-29 NOTE — TELEPHONE ENCOUNTER
Pt has not been in clinic since 1/27/2020    Sent [facility] [employer] message to schedule appt or send E-Visit if needs meds immediately.     Once schedules, then route to Dr Mejia for approval or denial.

## 2022-08-01 ENCOUNTER — MYC REFILL (OUTPATIENT)
Dept: INTERNAL MEDICINE | Facility: CLINIC | Age: 27
End: 2022-08-01

## 2022-08-01 RX ORDER — MODAFINIL 200 MG/1
200 TABLET ORAL DAILY
Qty: 30 TABLET | Refills: 0 | Status: CANCELLED | OUTPATIENT
Start: 2022-08-01

## 2022-09-08 ENCOUNTER — MYC REFILL (OUTPATIENT)
Dept: SLEEP MEDICINE | Facility: CLINIC | Age: 27
End: 2022-09-08

## 2022-09-08 DIAGNOSIS — G47.11 IDIOPATHIC HYPERSOMNOLENCE: ICD-10-CM

## 2022-09-08 DIAGNOSIS — G47.10 HYPERSOMNOLENCE: Primary | ICD-10-CM

## 2022-09-08 RX ORDER — MODAFINIL 100 MG/1
100 TABLET ORAL
Qty: 30 TABLET | Refills: 0 | Status: CANCELLED | OUTPATIENT
Start: 2022-09-08

## 2022-09-08 NOTE — TELEPHONE ENCOUNTER
Pending Prescriptions:                       Disp   Refills    modafinil (PROVIGIL) 100 MG tablet        21 tab*0            Sig: Take 1 tablet (100 mg) by mouth daily (with           lunch)    modafinil (PROVIGIL) 200 MG tablet        30 tab*0            Sig: Take 1 tablet (200 mg) by mouth daily    modafinil (PROVIGIL) 100 MG tablet        30 tab*0            Sig: Take 1 tablet (100 mg) by mouth daily (with           lunch)    modafinil (PROVIGIL) 200 MG tablet        30 tab*0            Sig: Take 1 tablet (200 mg) by mouth daily    modafinil (PROVIGIL) 100 MG tablet        30 tab*0            Sig: Take 1 tablet (100 mg) by mouth daily (with           lunch)    modafinil (PROVIGIL) 200 MG tablet        30 tab*0            Sig: Take 1 tablet (200 mg) by mouth daily    modafinil (PROVIGIL) 100 MG tablet        30 tab*0            Sig: Take 1 tablet (100 mg) by mouth daily (with           lunch)       Modafinil 100mg Last Written Prescription Date:  07/27/2022  Last Fill Quantity: 30,   # refills: 0    Modafinil 200mg Last Written Prescription Date:  08/30/2022  Last Fill Quantity: 30,   # refills: 0    Last Office Visit: 07/20/2022  Future Office visit:       Routing refill request to provider for review/approval because:  Drug not on the [facility], P or [facility] refill protocol or controlled substance

## 2022-09-12 RX ORDER — MODAFINIL 100 MG/1
100 TABLET ORAL
Qty: 30 TABLET | Refills: 0 | Status: SHIPPED | OUTPATIENT
Start: 2022-11-28 | End: 2023-03-16

## 2022-09-12 RX ORDER — MODAFINIL 200 MG/1
200 TABLET ORAL DAILY
Qty: 30 TABLET | Refills: 0 | Status: SHIPPED | OUTPATIENT
Start: 2022-11-28 | End: 2022-12-02

## 2022-09-12 RX ORDER — MODAFINIL 100 MG/1
100 TABLET ORAL
Qty: 30 TABLET | Refills: 0 | Status: SHIPPED | OUTPATIENT
Start: 2022-09-29 | End: 2022-12-15

## 2022-09-12 RX ORDER — MODAFINIL 200 MG/1
200 TABLET ORAL DAILY
Qty: 30 TABLET | Refills: 0 | Status: SHIPPED | OUTPATIENT
Start: 2022-10-29 | End: 2022-12-15

## 2022-09-12 RX ORDER — MODAFINIL 100 MG/1
100 TABLET ORAL
Qty: 30 TABLET | Refills: 0 | Status: SHIPPED | OUTPATIENT
Start: 2022-10-29 | End: 2022-12-15

## 2022-09-12 RX ORDER — MODAFINIL 100 MG/1
100 TABLET ORAL
Qty: 21 TABLET | Refills: 0 | Status: SHIPPED | OUTPATIENT
Start: 2022-09-12 | End: 2022-12-15

## 2022-09-12 RX ORDER — MODAFINIL 200 MG/1
200 TABLET ORAL DAILY
Qty: 30 TABLET | Refills: 0 | Status: SHIPPED | OUTPATIENT
Start: 2022-09-29 | End: 2022-12-15

## 2022-09-29 ENCOUNTER — MYC REFILL (OUTPATIENT)
Dept: INTERNAL MEDICINE | Facility: CLINIC | Age: 27
End: 2022-09-29

## 2022-09-29 DIAGNOSIS — G47.11 IDIOPATHIC HYPERSOMNOLENCE: ICD-10-CM

## 2022-09-29 DIAGNOSIS — G47.10 HYPERSOMNOLENCE: ICD-10-CM

## 2022-09-29 RX ORDER — MODAFINIL 200 MG/1
200 TABLET ORAL DAILY
Qty: 30 TABLET | Refills: 0 | Status: CANCELLED | OUTPATIENT
Start: 2022-09-29

## 2022-09-29 NOTE — TELEPHONE ENCOUNTER
Request denied as pharmacy has refills waiting. Message sent to patient notifying him.     Vanessa Nunes RN on 9/29/2022 at 11:17 AM

## 2022-10-22 ENCOUNTER — HEALTH MAINTENANCE LETTER (OUTPATIENT)
Age: 27
End: 2022-10-22

## 2022-12-01 ENCOUNTER — MYC REFILL (OUTPATIENT)
Dept: INTERNAL MEDICINE | Facility: CLINIC | Age: 27
End: 2022-12-01

## 2022-12-01 DIAGNOSIS — G47.11 IDIOPATHIC HYPERSOMNOLENCE: ICD-10-CM

## 2022-12-01 DIAGNOSIS — G47.10 HYPERSOMNOLENCE: ICD-10-CM

## 2022-12-01 RX ORDER — MODAFINIL 200 MG/1
200 TABLET ORAL DAILY
Qty: 30 TABLET | Refills: 0 | Status: CANCELLED | OUTPATIENT
Start: 2022-12-01

## 2022-12-02 RX ORDER — MODAFINIL 200 MG/1
200 TABLET ORAL DAILY
Qty: 30 TABLET | Refills: 0 | Status: SHIPPED | OUTPATIENT
Start: 2022-12-02 | End: 2022-12-15

## 2022-12-02 NOTE — TELEPHONE ENCOUNTER
Pending Prescriptions:                       Disp   Refills    modafinil (PROVIGIL) 200 MG tablet        30 tab*0            Sig: Take 1 tablet (200 mg) by mouth daily        Last Written Prescription Date:  9/12/22  Last Fill Quantity: 30,   # refills: 0  Last Office Visit: 7/20/22  Future Office visit:  NA     Routing refill request to provider for review/approval because:  Drug not on the [facility], P or [facility] refill protocol or controlled substance

## 2022-12-06 ENCOUNTER — TELEPHONE (OUTPATIENT)
Dept: SLEEP MEDICINE | Facility: CLINIC | Age: 27
End: 2022-12-06

## 2022-12-06 NOTE — TELEPHONE ENCOUNTER
Central Prior Authorization Team  Phone: [phone]    PA Initiation    Medication: modafinil (PROVIGIL) 200 MG tablet  Insurance Company: [employer] ([facility]) - Phone [phone] Fax [phone]  Ph[facility] the Rx[id number] [address]  Filling Pharmacy Phone: [phone]  Filling Pharmacy Fax:    Start Date: 12/6/2022

## 2022-12-06 NOTE — TELEPHONE ENCOUNTER
Prior Authorization Specialty Medication Request    Medication/Dose: modafinil (PROVIGIL) 200 MG tablet  ICD code (if different than what is on RX):    Hypersomnolence [G47.10]       Idiopathic hypersomnolence [G47.11]         Previously Tried and Failed:     Important Lab Values:   Rationale:    Insurance Name: [facility]  Insurance ID: [id number]  Insurance Phone Number:     Pharmacy Information (if different than what is on RX)  [facility]  Phone:  [phone]

## 2022-12-06 NOTE — TELEPHONE ENCOUNTER
Spoke with pharmacy. New PA is needed, new prescription is not. Starting new phone encounter for PA

## 2022-12-06 NOTE — TELEPHONE ENCOUNTER
New PA needed. Patient has new insurance from [employer]. ID [id number]    modafinil (PROVIGIL) 200 MG tablet 30 tablet 0 12/2/2022  No   Sig - Route: Take 1 tablet (200 mg) by mouth daily - Oral     Noy JANSEN RN  [facility]

## 2022-12-08 NOTE — TELEPHONE ENCOUNTER
Central Prior Authorization Team  Phone: [phone]    PRIOR AUTHORIZATION DENIED    Medication: modafinil (PROVIGIL) 200 MG tablet    Denial Date: 12/6/2022    Denial Rational:         Appeal Information:

## 2022-12-12 DIAGNOSIS — G47.11 IDIOPATHIC HYPERSOMNOLENCE: ICD-10-CM

## 2022-12-12 DIAGNOSIS — G47.10 HYPERSOMNOLENCE: ICD-10-CM

## 2022-12-12 NOTE — TELEPHONE ENCOUNTER
Patient is due for follow up. He had 100 mg modafinil dose added at lunch to his existing 200 mg morning dose. My chart appointment reminder sent.     Patient sent Mychart 12/1/22. Spoke with pharmacy. Patient has one refill left on both 100 mg dose and 200 mg dose. Last fill date for both was 11/13/22. Resetting calendar reminder to reflect this.     Noy JANSEN RN  [facility]

## 2022-12-15 RX ORDER — MODAFINIL 200 MG/1
200 TABLET ORAL DAILY
Qty: 30 TABLET | Refills: 0 | Status: SHIPPED | OUTPATIENT
Start: 2022-12-15 | End: 2023-04-12

## 2022-12-15 RX ORDER — MODAFINIL 200 MG/1
200 TABLET ORAL DAILY
Qty: 30 TABLET | Refills: 0 | Status: SHIPPED | OUTPATIENT
Start: 2023-01-14 | End: 2023-04-12

## 2022-12-15 RX ORDER — MODAFINIL 100 MG/1
100 TABLET ORAL
Qty: 30 TABLET | Refills: 0 | Status: SHIPPED | OUTPATIENT
Start: 2023-01-14 | End: 2023-04-12

## 2022-12-15 RX ORDER — MODAFINIL 100 MG/1
100 TABLET ORAL
Qty: 30 TABLET | Refills: 0 | Status: SHIPPED | OUTPATIENT
Start: 2022-12-15 | End: 2023-04-12

## 2022-12-15 RX ORDER — MODAFINIL 100 MG/1
100 TABLET ORAL
Qty: 30 TABLET | Refills: 0 | Status: SHIPPED | OUTPATIENT
Start: 2023-02-17 | End: 2023-04-12

## 2022-12-15 RX ORDER — MODAFINIL 200 MG/1
200 TABLET ORAL DAILY
Qty: 30 TABLET | Refills: 0 | Status: SHIPPED | OUTPATIENT
Start: 2023-02-17 | End: 2023-03-16

## 2022-12-15 NOTE — TELEPHONE ENCOUNTER
Patient returned call. He would like to pay for prescription out of pocket. It has been working well for him.  [facility] at [facility]. Requesting new prescriptions to be sent. He has been out of medication for 2 weeks.     Noy JANSEN RN  [facility]

## 2023-02-24 ASSESSMENT — SLEEP AND FATIGUE QUESTIONNAIRES
HOW LIKELY ARE YOU TO NOD OFF OR FALL ASLEEP WHILE LYING DOWN TO REST IN THE AFTERNOON WHEN CIRCUMSTANCES PERMIT: HIGH CHANCE OF DOZING
HOW LIKELY ARE YOU TO NOD OFF OR FALL ASLEEP WHILE SITTING AND READING: MODERATE CHANCE OF DOZING
HOW LIKELY ARE YOU TO NOD OFF OR FALL ASLEEP WHEN YOU ARE A PASSENGER IN A CAR FOR AN HOUR WITHOUT A BREAK: MODERATE CHANCE OF DOZING
HOW LIKELY ARE YOU TO NOD OFF OR FALL ASLEEP WHILE WATCHING TV: HIGH CHANCE OF DOZING
HOW LIKELY ARE YOU TO NOD OFF OR FALL ASLEEP WHILE SITTING AND TALKING TO SOMEONE: SLIGHT CHANCE OF DOZING
HOW LIKELY ARE YOU TO NOD OFF OR FALL ASLEEP WHILE SITTING QUIETLY AFTER LUNCH WITHOUT ALCOHOL: SLIGHT CHANCE OF DOZING
HOW LIKELY ARE YOU TO NOD OFF OR FALL ASLEEP WHILE SITTING INACTIVE IN A PUBLIC PLACE: MODERATE CHANCE OF DOZING
HOW LIKELY ARE YOU TO NOD OFF OR FALL ASLEEP IN A CAR, WHILE STOPPED FOR A FEW MINUTES IN TRAFFIC: SLIGHT CHANCE OF DOZING

## 2023-03-03 ENCOUNTER — VIRTUAL VISIT (OUTPATIENT)
Dept: SLEEP MEDICINE | Facility: CLINIC | Age: 28
End: 2023-03-03
Payer: COMMERCIAL

## 2023-03-03 VITALS — BODY MASS INDEX: 31.83 KG/M2 | WEIGHT: 210 LBS | HEIGHT: 68 IN

## 2023-03-03 DIAGNOSIS — G47.11 IDIOPATHIC HYPERSOMNOLENCE: Primary | ICD-10-CM

## 2023-03-03 PROCEDURE — 99214 OFFICE O/P EST MOD 30 MIN: CPT | Mod: VID | Performed by: PHYSICIAN ASSISTANT

## 2023-03-03 ASSESSMENT — PAIN SCALES - GENERAL: PAINLEVEL: NO PAIN (0)

## 2023-03-03 NOTE — NURSING NOTE
Is the patient currently in the state of [place]? YES    Visit mode:VIDEO    If the visit is dropped, the patient can be reconnected by: VIDEO VISIT: Text to cell phone: [phone]    Will anyone else be joining the visit? NO      How would you like to obtain your AVS? MyChart    Are changes needed to the allergy or medication list? NO    Reason for visit:   Follow up    Komal Chin, VF

## 2023-03-03 NOTE — PROGRESS NOTES
.  Video-Visit Details     Type of service:  Video Visit     Video Start Time (time video started): 9:05AM     Video End Time (time video stopped): 9:18AM    Originating Location (pt. Location): Home          Distant Location (provider location):  Off-site     Mode of Communication:  Video Conference via [facility]   Outpatient Sleep Medicine  Mar 3, 2023       Name: Sage Aburto MRN# [id number]   Age: 27 year old YOB: 1995            Assessment and Plan:   1. Idiopathic hypersomnolence  Doing very well on modafinil.  Takes 200 mg dose in the morning shortly after waking and additional 100 mg tablet with lunch.  No side effects and tolerating well with good benefit so we will continue at current dosages.  He will continue to use [facility] since cheaper for him than going through insurance.  We will see him back for follow-up in 6 months for med check, sooner as needed.        Chief Complaint      Chief Complaint   Patient presents with     Video Visit     Follow up            History of Present Illness:   Sage Aburto is a 27 year old male who presents to the clinic for follow-up of idiopathic hypersomnolence treated with modafinil 300mg.      Summary of hypersomnolence testing completed 5/2022:  Pre-PSG evaluation showed relatively consistent sleep intake of approximately 7-8 hours from 10:30-11:00PM to roughly 6:30AM.      PSG was unrevealing. No evidence of significant sleep disordered breathing with AHI 2.0, RDI 3.5.  No sustained hypoxemia.  No evidence of sleep-related movement disorder, few PLM's with index 4.2, arousal at 1.9. Normal sinus rhythm on cardiac monitoring.  Sleep architecture revealed all sleep stages present with normal duration and normal sleep efficiency 95%.  Sleep latency was decreased at 5 minutes.  REM latency 143.5 minutes.     MSLT completed following day consisted of 4 naps.  Mean sleep latency during these naps 5.8 minutes  "(nap one 15.1 minutes, nap two 2 minutes, nap three 1.9 minute, nap four 4.5 minutes).  No sleep onset REM.     Urine drug screen negative.     Following this testing we re-started on modafinil 200mg. Was previously prescribed modafinil by Bennett Goltz PAC in the past for sleepiness with DASHAWN and tolerated well.     Last visit 7/2022 was doing well on modafinil 200mg daily, no longer falling asleep in social settings but still sleepy during the day (ESS 17, improved from 21). Agreed to addition of 100mg tablet to be added in late morning/early afternoon.     Returns today to discuss progress on increased modafinil dose. Things are going \"really well\". Takes 200mg tablet in morning about 30 minutes after waking and will take second dose 12-1:00PM with lunch. Feels extra dose later is helpful to get through rest of the day. No insomnia with higher dose, no increase in headaches, no other side effects. New insurance doesn't covered unfortunately but using [employer] and is actually cheaper than previous copay, $42 which is \"perfectly fine with me\".     Sleep schedule: Bedtime is typically 9-10:00PM all days of the week. Usually it takes about 5 minutes to fall asleep. They are typically getting out of bed at 5-5:30AM on workdays and 8-9:00AM on weekends. May take a rare nap on the weekend but \"it's not planned or needed\".      SCALES:               SLEEPINESS: today ESS 15, last visit on modafinil 200mg was 17, pre-medication was 21/24              INSOMNIA: today VICENTE 11, last visit 10, pre-medication 19/28    Past medical/surgical history, family history, social history, medications and allergies were reviewed.           Physical Examination:   Ht 1.727 m (5' 8\")   Wt 95.3 kg (210 lb)   BMI 31.93 kg/m    General appearance: Awake, alert, cooperative. Well groomed. Sitting comfortably in chair. In no apparent distress.  HEENT: Head: Normocephalic, atraumatic. Eyes:Conjunctiva clear. Sclera normal. Nose: External " appearance without deformity.   Pulmonary:  Able to speak easily in full sentences. No cough or wheeze.   Skin:  No rashes or significant lesions on visible skin.   Neurologic: Alert, oriented x3.   Psychiatric: Mood euthymic. Affect congruent with full range and intensity.      Leta Warner PA-C  Mar 3, 2023     [facility]  [address] [address]     [facility]  [address], [place] [address]    Chart documentation was completed, in part, with [employer] voice-recognition software. Even though reviewed, some grammatical, spelling, and word errors may remain.    30 minutes spent on day of encounter doing chart review, history and exam, documentation, and further activities as noted above

## 2023-03-10 NOTE — NURSING NOTE
Pt has signed the controlled substance agreement and scheduled follow up.      KANDY Huynh       Detail Level: Simple Additional Notes: ABCD's of mole and melanoma, sun protection, sunscreen and self skin exams reviewed.

## 2023-03-15 ENCOUNTER — NURSE TRIAGE (OUTPATIENT)
Dept: NURSING | Facility: CLINIC | Age: 28
End: 2023-03-15
Payer: COMMERCIAL

## 2023-03-15 NOTE — TELEPHONE ENCOUNTER
Patient had virtual visit with sleep center in [place] with Leta Warner PA-C 3-3-23 and was expecting prescriptions for his Provigil medication, both the 200 mg tan and the 100 mg tablet.    Patient will call back in the morning.    Rozina Elizondo RN  [facility]    Reason for Disposition    [1] Prescription refill request for NON-ESSENTIAL medicine (i.e., no harm to patient if med not taken) AND [2] triager unable to refill per department policy    Protocols used: MEDICATION REFILL AND RENEWAL CALL-A-AH

## 2023-03-16 DIAGNOSIS — G47.10 HYPERSOMNOLENCE: ICD-10-CM

## 2023-03-16 DIAGNOSIS — G47.11 IDIOPATHIC HYPERSOMNOLENCE: ICD-10-CM

## 2023-03-16 RX ORDER — MODAFINIL 200 MG/1
200 TABLET ORAL DAILY
Qty: 30 TABLET | Refills: 0 | Status: SHIPPED | OUTPATIENT
Start: 2023-03-16 | End: 2023-04-12

## 2023-03-16 RX ORDER — MODAFINIL 100 MG/1
100 TABLET ORAL
Qty: 30 TABLET | Refills: 0 | Status: SHIPPED | OUTPATIENT
Start: 2023-03-16 | End: 2023-04-12

## 2023-03-16 NOTE — TELEPHONE ENCOUNTER
Pending Prescriptions:                       Disp   Refills    modafinil (PROVIGIL) 200 MG tablet        30 tab*0            Sig: Take 1 tablet (200 mg) by mouth daily    modafinil (PROVIGIL) 100 MG tablet        30 tab*0            Sig: Take 1 tablet (100 mg) by mouth daily (with           lunch)        Last Written Prescription Date:  12/15  Last Fill Quantity: 30, 30   # refills: 0  Last Office Visit: 3/3/23  Future Office visit: NA      Routing refill request to provider for review/approval because:  Drug not on the [facility], [facility] or [facility] refill protocol or controlled substance

## 2023-04-01 ENCOUNTER — HEALTH MAINTENANCE LETTER (OUTPATIENT)
Age: 28
End: 2023-04-01

## 2023-04-10 ENCOUNTER — MYC MEDICAL ADVICE (OUTPATIENT)
Dept: SLEEP MEDICINE | Facility: CLINIC | Age: 28
End: 2023-04-10
Payer: COMMERCIAL

## 2023-04-10 DIAGNOSIS — G47.11 IDIOPATHIC HYPERSOMNOLENCE: ICD-10-CM

## 2023-04-10 DIAGNOSIS — G47.10 HYPERSOMNOLENCE: ICD-10-CM

## 2023-04-12 DIAGNOSIS — G47.10 HYPERSOMNOLENCE: ICD-10-CM

## 2023-04-12 DIAGNOSIS — G47.11 IDIOPATHIC HYPERSOMNOLENCE: ICD-10-CM

## 2023-04-12 RX ORDER — MODAFINIL 200 MG/1
200 TABLET ORAL DAILY
Qty: 30 TABLET | Refills: 0 | Status: SHIPPED | OUTPATIENT
Start: 2023-04-16 | End: 2023-06-16

## 2023-04-12 RX ORDER — MODAFINIL 200 MG/1
200 TABLET ORAL DAILY
Qty: 30 TABLET | Refills: 0 | Status: SHIPPED | OUTPATIENT
Start: 2023-05-16 | End: 2023-06-16

## 2023-04-12 RX ORDER — MODAFINIL 100 MG/1
100 TABLET ORAL
Qty: 30 TABLET | Refills: 0 | Status: SHIPPED | OUTPATIENT
Start: 2023-04-16 | End: 2023-06-16

## 2023-04-12 RX ORDER — MODAFINIL 100 MG/1
100 TABLET ORAL
Qty: 30 TABLET | Refills: 0 | Status: SHIPPED | OUTPATIENT
Start: 2023-05-16 | End: 2023-06-16

## 2023-04-12 RX ORDER — MODAFINIL 100 MG/1
100 TABLET ORAL
Qty: 30 TABLET | Refills: 0 | Status: CANCELLED | OUTPATIENT
Start: 2023-04-16

## 2023-04-12 RX ORDER — MODAFINIL 200 MG/1
200 TABLET ORAL DAILY
Qty: 30 TABLET | Refills: 0 | Status: SHIPPED | OUTPATIENT
Start: 2023-06-15 | End: 2023-06-16

## 2023-04-12 RX ORDER — MODAFINIL 200 MG/1
200 TABLET ORAL DAILY
Qty: 30 TABLET | Refills: 0 | Status: CANCELLED | OUTPATIENT
Start: 2023-06-15

## 2023-04-12 RX ORDER — MODAFINIL 100 MG/1
100 TABLET ORAL
Qty: 30 TABLET | Refills: 0 | Status: CANCELLED | OUTPATIENT
Start: 2023-05-16

## 2023-04-12 RX ORDER — MODAFINIL 100 MG/1
100 TABLET ORAL
Qty: 30 TABLET | Refills: 0 | Status: SHIPPED | OUTPATIENT
Start: 2023-06-15 | End: 2023-06-16

## 2023-04-12 RX ORDER — MODAFINIL 200 MG/1
200 TABLET ORAL DAILY
Qty: 30 TABLET | Refills: 0 | Status: CANCELLED | OUTPATIENT
Start: 2023-04-16

## 2023-04-12 RX ORDER — MODAFINIL 200 MG/1
200 TABLET ORAL DAILY
Qty: 30 TABLET | Refills: 0 | Status: CANCELLED | OUTPATIENT
Start: 2023-05-16

## 2023-04-12 RX ORDER — MODAFINIL 100 MG/1
100 TABLET ORAL
Qty: 30 TABLET | Refills: 0 | Status: CANCELLED | OUTPATIENT
Start: 2023-06-15

## 2023-04-12 NOTE — TELEPHONE ENCOUNTER
Pending Prescriptions:                       Disp   Refills    modafinil (PROVIGIL) 100 MG tablet        30 tab*0            Sig: Take 1 tablet (100 mg) by mouth daily (with           lunch)    modafinil (PROVIGIL) 100 MG tablet        30 tab*0            Sig: Take 1 tablet (100 mg) by mouth daily (with           lunch)    modafinil (PROVIGIL) 100 MG tablet        30 tab*0            Sig: Take 1 tablet (100 mg) by mouth daily (with           lunch)    modafinil (PROVIGIL) 200 MG tablet        30 tab*0            Sig: Take 1 tablet (200 mg) by mouth daily    modafinil (PROVIGIL) 200 MG tablet        30 tab*0            Sig: Take 1 tablet (200 mg) by mouth daily    modafinil (PROVIGIL) 200 MG tablet        30 tab*0            Sig: Take 1 tablet (200 mg) by mouth daily    Last Written Prescription Date:  3/16/23  Last Fill Quantity: 30,30   # refills: 0  Last Office Visit: 3/3/23  Future Office visit:  NA     Routing refill request to provider for review/approval because:  Drug not on the [facility], [facility] or [facility] refill protocol or controlled substance

## 2023-06-16 ENCOUNTER — MYC REFILL (OUTPATIENT)
Dept: INTERNAL MEDICINE | Facility: CLINIC | Age: 28
End: 2023-06-16
Payer: COMMERCIAL

## 2023-06-16 DIAGNOSIS — G47.11 IDIOPATHIC HYPERSOMNOLENCE: ICD-10-CM

## 2023-06-16 DIAGNOSIS — G47.10 HYPERSOMNOLENCE: ICD-10-CM

## 2023-06-16 RX ORDER — MODAFINIL 100 MG/1
100 TABLET ORAL
Qty: 30 TABLET | Refills: 0 | Status: SHIPPED | OUTPATIENT
Start: 2023-07-16 | End: 2023-09-28

## 2023-06-16 RX ORDER — MODAFINIL 200 MG/1
200 TABLET ORAL DAILY
Qty: 30 TABLET | Refills: 0 | Status: CANCELLED | OUTPATIENT
Start: 2023-06-16

## 2023-06-16 RX ORDER — MODAFINIL 200 MG/1
200 TABLET ORAL DAILY
Qty: 30 TABLET | Refills: 0 | Status: SHIPPED | OUTPATIENT
Start: 2023-09-14 | End: 2023-10-16

## 2023-06-16 RX ORDER — MODAFINIL 200 MG/1
200 TABLET ORAL DAILY
Qty: 30 TABLET | Refills: 0 | Status: SHIPPED | OUTPATIENT
Start: 2023-07-16 | End: 2023-09-28

## 2023-06-16 RX ORDER — MODAFINIL 200 MG/1
200 TABLET ORAL DAILY
Qty: 30 TABLET | Refills: 0 | Status: SHIPPED | OUTPATIENT
Start: 2023-08-15 | End: 2023-09-28

## 2023-06-16 RX ORDER — MODAFINIL 100 MG/1
100 TABLET ORAL
Qty: 30 TABLET | Refills: 0 | Status: CANCELLED | OUTPATIENT
Start: 2023-06-16

## 2023-06-16 RX ORDER — MODAFINIL 100 MG/1
100 TABLET ORAL
Qty: 30 TABLET | Refills: 0 | Status: SHIPPED | OUTPATIENT
Start: 2023-09-14 | End: 2023-10-16

## 2023-06-16 RX ORDER — MODAFINIL 100 MG/1
100 TABLET ORAL
Qty: 30 TABLET | Refills: 0 | Status: SHIPPED | OUTPATIENT
Start: 2023-08-15 | End: 2023-09-28

## 2023-06-16 NOTE — TELEPHONE ENCOUNTER
Spoke with pharmacy. Prescriptions picked up today, not showing on . 3 months pended with start date 7/16/23    Pending Prescriptions:                       Disp   Refills    modafinil (PROVIGIL) 100 MG tablet        30 tab*0            Sig: Take 1 tablet (100 mg) by mouth daily (with           lunch)    modafinil (PROVIGIL) 100 MG tablet        30 tab*0            Sig: Take 1 tablet (100 mg) by mouth daily (with           lunch)    modafinil (PROVIGIL) 100 MG tablet        30 tab*0            Sig: Take 1 tablet (100 mg) by mouth daily (with           lunch)    modafinil (PROVIGIL) 200 MG tablet        30 tab*0            Sig: Take 1 tablet (200 mg) by mouth daily    modafinil (PROVIGIL) 200 MG tablet        30 tab*0            Sig: Take 1 tablet (200 mg) by mouth daily    modafinil (PROVIGIL) 200 MG tablet        30 tab*0            Sig: Take 1 tablet (200 mg) by mouth daily          Last Written Prescription Date:  6/15/23  Last Fill Quantity: 30,   # refills: 0  Last Office Visit: 3/3/23  Future Office visit:   none    Routing refill request to provider for review/approval because:  Drug not on the [facility], [facility] or [facility] refill protocol or controlled substance    Noy JANSEN RN  [facility]

## 2023-07-06 ENCOUNTER — MYC MEDICAL ADVICE (OUTPATIENT)
Dept: SLEEP MEDICINE | Facility: CLINIC | Age: 28
End: 2023-07-06
Payer: COMMERCIAL

## 2023-07-12 DIAGNOSIS — B00.9 HERPES SIMPLEX VIRUS INFECTION: ICD-10-CM

## 2023-07-12 RX ORDER — VALACYCLOVIR HYDROCHLORIDE 500 MG/1
TABLET, FILM COATED ORAL
Qty: 90 TABLET | Refills: 3 | Status: SHIPPED | OUTPATIENT
Start: 2023-07-12

## 2023-07-17 ENCOUNTER — TELEPHONE (OUTPATIENT)
Dept: SLEEP MEDICINE | Facility: CLINIC | Age: 28
End: 2023-07-17
Payer: COMMERCIAL

## 2023-07-17 NOTE — TELEPHONE ENCOUNTER
Patient due on stim list. Refills sent 6/16/23 for provigil 100 mg and 200 mg. Due 10/14 for next refills. Resetting stim list    Noy JANSEN RN  [facility]

## 2023-08-08 DIAGNOSIS — G47.11 IDIOPATHIC HYPERSOMNOLENCE: Primary | ICD-10-CM

## 2023-09-21 ASSESSMENT — SLEEP AND FATIGUE QUESTIONNAIRES
HOW LIKELY ARE YOU TO NOD OFF OR FALL ASLEEP WHILE LYING DOWN TO REST IN THE AFTERNOON WHEN CIRCUMSTANCES PERMIT: MODERATE CHANCE OF DOZING
HOW LIKELY ARE YOU TO NOD OFF OR FALL ASLEEP WHILE SITTING QUIETLY AFTER LUNCH WITHOUT ALCOHOL: SLIGHT CHANCE OF DOZING
HOW LIKELY ARE YOU TO NOD OFF OR FALL ASLEEP WHEN YOU ARE A PASSENGER IN A CAR FOR AN HOUR WITHOUT A BREAK: MODERATE CHANCE OF DOZING
HOW LIKELY ARE YOU TO NOD OFF OR FALL ASLEEP WHILE SITTING AND TALKING TO SOMEONE: SLIGHT CHANCE OF DOZING
HOW LIKELY ARE YOU TO NOD OFF OR FALL ASLEEP IN A CAR, WHILE STOPPED FOR A FEW MINUTES IN TRAFFIC: SLIGHT CHANCE OF DOZING
HOW LIKELY ARE YOU TO NOD OFF OR FALL ASLEEP WHILE SITTING INACTIVE IN A PUBLIC PLACE: SLIGHT CHANCE OF DOZING
HOW LIKELY ARE YOU TO NOD OFF OR FALL ASLEEP WHILE SITTING AND READING: HIGH CHANCE OF DOZING
HOW LIKELY ARE YOU TO NOD OFF OR FALL ASLEEP WHILE WATCHING TV: MODERATE CHANCE OF DOZING

## 2023-09-28 ENCOUNTER — OFFICE VISIT (OUTPATIENT)
Dept: SLEEP MEDICINE | Facility: CLINIC | Age: 28
End: 2023-09-28
Payer: COMMERCIAL

## 2023-09-28 VITALS
HEIGHT: 68 IN | WEIGHT: 226 LBS | SYSTOLIC BLOOD PRESSURE: 132 MMHG | HEART RATE: 72 BPM | BODY MASS INDEX: 34.25 KG/M2 | DIASTOLIC BLOOD PRESSURE: 80 MMHG | OXYGEN SATURATION: 96 %

## 2023-09-28 DIAGNOSIS — G47.11 IDIOPATHIC HYPERSOMNOLENCE: Primary | ICD-10-CM

## 2023-09-28 PROCEDURE — 99215 OFFICE O/P EST HI 40 MIN: CPT | Performed by: PHYSICIAN ASSISTANT

## 2023-09-28 NOTE — NURSING NOTE
"Chief Complaint   Patient presents with    Sleep Problem     Medication follow up       Initial /80   Pulse 72   Ht 1.727 m (5' 8\")   Wt 102.5 kg (226 lb)   SpO2 96%   BMI 34.36 kg/m   Estimated body mass index is 34.36 kg/m  as calculated from the following:    Height as of this encounter: 1.727 m (5' 8\").    Weight as of this encounter: 102.5 kg (226 lb).    Medication Reconciliation: complete  ESS 13  Leobardo Lew MA     "

## 2023-09-28 NOTE — PROGRESS NOTES
[facility]   Outpatient Sleep Medicine  Sep 28, 2023     Name: Sage Aburto MRN# [id number]   Age: 27 year old YOB: 1995            Assessment and Plan:   1. Idiopathic hypersomnolence  Doing well on current modafinil 300mg daily, taking 200mg in AM after waking and additional 100mg tablet in afternoon. No side effects but worried he is building a tolerance. Discussed drug holidays can help and he will start taking weekends off when he can. Feels modafinil not as effective as it once was. His [place] sleepiness scale is actually lowest it has ever been today at 13/24, without medication was as high as 21/24. After discussion of options agreed to split his dose to TID with 100mg taken around 6AM when wakes up, another 100mg around 10AM and last 100mg dose around 1-2:00PM if needed. Hopeful will get longer lasting benefit from TID dosing. Can consider trial of increasing to 400mg pending progress, or switching to armodafinil or Adderall/Ritalin to see if different medication may work better for him. Asked him to keep me updated via [employer] how things go. Follow-up in 6 months for recheck.        Chief Complaint      Chief Complaint   Patient presents with    Sleep Problem     Medication follow up          History of Present Illness:     Sage Aburto is a 27 year old male who presents to the clinic for follow-up of idiopathic hypersomnolence treated with modafinil 300mg.      Summary of hypersomnolence testing completed 5/2022:  Pre-PSG evaluation showed relatively consistent sleep intake of approximately 7-8 hours from 10:30-11:00PM to roughly 6:30AM.      PSG was unrevealing. No evidence of significant sleep disordered breathing with AHI 2.0, RDI 3.5.  No sustained hypoxemia.  No evidence of sleep-related movement disorder, few PLM's with index 4.2, arousal at 1.9. Normal sinus rhythm on cardiac monitoring.  Sleep architecture revealed all sleep stages present with normal  "duration and normal sleep efficiency 95%.  Sleep latency was decreased at 5 minutes.  REM latency 143.5 minutes.     MSLT completed following day consisted of 4 naps.  Mean sleep latency during these naps 5.8 minutes (nap one 15.1 minutes, nap two 2 minutes, nap three 1.9 minute, nap four 4.5 minutes).  No sleep onset REM.     Urine drug screen negative.      Following this testing we re-started on modafinil 200mg. Was previously prescribed modafinil by Bennett Goltz PAC in the past for sleepiness with DASHAWN and tolerated well.      Visit 7/2022 was doing well on modafinil 200mg daily, no longer falling asleep in social settings but still sleepy during the day (ESS 17, improved from 21). Agreed to addition of 100mg tablet to be added in late morning/early afternoon.     Last visit 3/2023 things were going \"really well\" on modafinil 300mg, taking 200mg in AM about 30 minutes after waking and 100mg 12-1:00PM with lunch. Feels extra dose later is helpful to get through rest of the day. No insomnia with higher dose, no increase in headaches, no other side effects.     Returns today for 6 month medication check visit. Continues to tolerate well and benefits from modafinil but worried he is developing a tolerance, started drinking caffeine again to offset some sleepiness that is coming back even despite medication. Drinking cup of coffee in AM and often another cup later in day, when first started on modafinil drank zero coffee as didn't feel needed. Always takes his 200mg dose in AM but sometimes skips 100mg dose later in day if doesn't feel needed. No drug holidays. Sleeping 7-8 hours a night, sleeping by 10:00PM and wakes 5-6:00AM. Wakes 1-2 times a night due to the dog pawing at carpet and waking him.     SCALES:    SLEEPINESS: today 13 on modafinil 300mg, last visit ESS 15 on modafinil 300mg, on modafinil 200mg was 17, pre-medication was 21/24              INSOMNIA: today 14, last visit was VICENTE 11, pre-medication " "19/28    Past medical/surgical history, family history, social history, medications and allergies were reviewed.           Physical Examination:   /80   Pulse 72   Ht 1.727 m (5' 8\")   Wt 102.5 kg (226 lb)   SpO2 96%   BMI 34.36 kg/m    General appearance: Awake, alert, cooperative. Well groomed. Sitting comfortably in chair. In no apparent distress.  HEENT: Head: Normocephalic, atraumatic. Eyes:Conjunctiva clear. Sclera normal. Nose: External appearance without deformity.   Pulmonary:  Able to speak easily in full sentences. No cough or wheeze.   Skin:  No rashes or significant lesions on visible skin.   Neurologic: Alert, oriented x3.   Psychiatric: Mood euthymic. Affect congruent with full range and intensity.      CC:  Anayeli Mejia PA-C  Sep 28, 2023     [facility] [place]  [address] [place] [address]     [facility] [place]  [address] [address]    Chart documentation was completed, in part, with [employer] voice-recognition software. Even though reviewed, some grammatical, spelling, and word errors may remain.    42 minutes spent on day of encounter doing chart review, history and exam, documentation, and further activities as noted above  "

## 2023-10-16 DIAGNOSIS — G47.10 HYPERSOMNOLENCE: ICD-10-CM

## 2023-10-16 DIAGNOSIS — G47.11 IDIOPATHIC HYPERSOMNOLENCE: ICD-10-CM

## 2023-10-16 RX ORDER — MODAFINIL 200 MG/1
200 TABLET ORAL DAILY
Qty: 30 TABLET | Refills: 0 | Status: SHIPPED | OUTPATIENT
Start: 2023-12-15 | End: 2024-01-03

## 2023-10-16 RX ORDER — MODAFINIL 100 MG/1
100 TABLET ORAL
Qty: 30 TABLET | Refills: 0 | Status: SHIPPED | OUTPATIENT
Start: 2023-10-18 | End: 2023-11-17

## 2023-10-16 RX ORDER — MODAFINIL 200 MG/1
200 TABLET ORAL DAILY
Qty: 30 TABLET | Refills: 0 | Status: SHIPPED | OUTPATIENT
Start: 2023-11-15 | End: 2024-02-05

## 2023-10-16 RX ORDER — MODAFINIL 100 MG/1
100 TABLET ORAL
Qty: 30 TABLET | Refills: 0 | Status: SHIPPED | OUTPATIENT
Start: 2023-11-17 | End: 2023-11-17

## 2023-10-16 RX ORDER — MODAFINIL 100 MG/1
100 TABLET ORAL
Qty: 30 TABLET | Refills: 0 | Status: SHIPPED | OUTPATIENT
Start: 2023-12-17 | End: 2024-01-25

## 2023-10-16 RX ORDER — MODAFINIL 200 MG/1
200 TABLET ORAL DAILY
Qty: 30 TABLET | Refills: 0 | Status: SHIPPED | OUTPATIENT
Start: 2023-10-16 | End: 2023-11-17

## 2023-10-16 NOTE — TELEPHONE ENCOUNTER
Pending Prescriptions:                       Disp   Refills    modafinil (PROVIGIL) 100 MG tablet        30 tab*0            Sig: Take 1 tablet (100 mg) by mouth daily (with           lunch)    modafinil (PROVIGIL) 100 MG tablet        30 tab*0            Sig: Take 1 tablet (100 mg) by mouth daily (with           lunch)    modafinil (PROVIGIL) 100 MG tablet        30 tab*0            Sig: Take 1 tablet (100 mg) by mouth daily (with           lunch)    modafinil (PROVIGIL) 200 MG tablet        30 tab*0            Sig: Take 1 tablet (200 mg) by mouth daily    modafinil (PROVIGIL) 200 MG tablet        30 tab*0            Sig: Take 1 tablet (200 mg) by mouth daily    modafinil (PROVIGIL) 200 MG tablet        30 tab*0            Sig: Take 1 tablet (200 mg) by mouth daily          Last Written Prescription Date:  6/16/23  Last Fill Quantity: 30,30   # refills: 0  Last Office Visit: 9/28/23  Future Office visit: 3/28/24     Routing refill request to provider for review/approval because:  Drug not on the [facility], [facility] or [facility] refill protocol or controlled substance

## 2023-10-18 ENCOUNTER — TELEPHONE (OUTPATIENT)
Dept: SLEEP MEDICINE | Facility: CLINIC | Age: 28
End: 2023-10-18

## 2023-10-18 NOTE — TELEPHONE ENCOUNTER
Prior Authorization Retail Medication Request    Medication/Dose: Modafinil 200mg  ICD code (if different than what is on RX):    Previously Tried and Failed:    Rationale:      Insurance Name:  [facility]   Insurance ID:  [id number]      Pharmacy Information (if different than what is on RX)  [facility] [id number] [facility] [address]  Phone:  [phone]

## 2023-10-19 NOTE — TELEPHONE ENCOUNTER
PA Initiation    Medication: MODAFINIL 200 MG PO TABS  Insurance Company: [employer] ([facility]) - Phone [phone] Fax [phone]  Pharmacy Filling the Rx: [id number] [place], [place] - [address]  Filling Pharmacy Phone: [phone]  Filling Pharmacy Fax: [phone]  Start Date: 10/19/2023

## 2023-10-19 NOTE — TELEPHONE ENCOUNTER
PRIOR AUTHORIZATION DENIED    Medication: MODAFINIL 200 MG PO TABS    Insurance Company: [employer] ([facility]) - Phone [phone] Fax [phone]    Denial Date: 10/19/2023    Denial Rational: Diagnosis is not a FDA approved indication for the medication, plan covers medication for treatment of adjunctive therapy of major depressive disorder or bipolar depression, fatigue due to multiple sclerosis, obstructive sleep apnea or shift work disorder.     Appeal Information:

## 2023-11-16 ASSESSMENT — ENCOUNTER SYMPTOMS
COUGH: 0
HEMATOCHEZIA: 0
HEADACHES: 0
MYALGIAS: 0
DIZZINESS: 0
DIARRHEA: 0
SORE THROAT: 0
HEMATURIA: 0
FREQUENCY: 0
EYE PAIN: 0
FEVER: 0
WEAKNESS: 0
ARTHRALGIAS: 0
PALPITATIONS: 0
JOINT SWELLING: 0
SHORTNESS OF BREATH: 0
CHILLS: 0
CONSTIPATION: 0
NAUSEA: 0
ABDOMINAL PAIN: 0
DYSURIA: 0
HEARTBURN: 0
NERVOUS/ANXIOUS: 0
PARESTHESIAS: 0

## 2023-11-17 ENCOUNTER — OFFICE VISIT (OUTPATIENT)
Dept: INTERNAL MEDICINE | Facility: CLINIC | Age: 28
End: 2023-11-17
Payer: COMMERCIAL

## 2023-11-17 VITALS
OXYGEN SATURATION: 100 % | WEIGHT: 228 LBS | HEIGHT: 68 IN | TEMPERATURE: 98.1 F | SYSTOLIC BLOOD PRESSURE: 132 MMHG | RESPIRATION RATE: 18 BRPM | DIASTOLIC BLOOD PRESSURE: 78 MMHG | HEART RATE: 81 BPM | BODY MASS INDEX: 34.56 KG/M2

## 2023-11-17 DIAGNOSIS — Z11.59 NEED FOR HEPATITIS C SCREENING TEST: ICD-10-CM

## 2023-11-17 DIAGNOSIS — Z00.00 ANNUAL PHYSICAL EXAM: Primary | ICD-10-CM

## 2023-11-17 DIAGNOSIS — G47.11 IDIOPATHIC HYPERSOMNOLENCE: ICD-10-CM

## 2023-11-17 DIAGNOSIS — Z13.220 SCREENING FOR HYPERLIPIDEMIA: ICD-10-CM

## 2023-11-17 DIAGNOSIS — Z23 NEED FOR INFLUENZA VACCINATION: ICD-10-CM

## 2023-11-17 DIAGNOSIS — B00.9 HERPES SIMPLEX VIRUS INFECTION: ICD-10-CM

## 2023-11-17 PROCEDURE — 90471 IMMUNIZATION ADMIN: CPT | Performed by: INTERNAL MEDICINE

## 2023-11-17 PROCEDURE — 99213 OFFICE O/P EST LOW 20 MIN: CPT | Mod: 25 | Performed by: INTERNAL MEDICINE

## 2023-11-17 PROCEDURE — 99385 PREV VISIT NEW AGE 18-39: CPT | Mod: 25 | Performed by: INTERNAL MEDICINE

## 2023-11-17 PROCEDURE — 90686 IIV4 VACC NO PRSV 0.5 ML IM: CPT | Performed by: INTERNAL MEDICINE

## 2023-11-17 ASSESSMENT — ENCOUNTER SYMPTOMS
WEAKNESS: 0
ABDOMINAL PAIN: 0
NERVOUS/ANXIOUS: 0
SHORTNESS OF BREATH: 0
HEARTBURN: 0
FREQUENCY: 0
EYE PAIN: 0
CHILLS: 0
DIARRHEA: 0
DYSURIA: 0
HEADACHES: 0
NAUSEA: 0
HEMATURIA: 0
DIZZINESS: 0
FEVER: 0
COUGH: 0
JOINT SWELLING: 0
PARESTHESIAS: 0
SORE THROAT: 0
ARTHRALGIAS: 0
PALPITATIONS: 0
CONSTIPATION: 0
HEMATOCHEZIA: 0
MYALGIAS: 0

## 2023-11-17 NOTE — PROGRESS NOTES
"SUBJECTIVE:   Tomas is a 27 year old, presenting for the following:  Physical      Patient is a 27-year-old  male who presents to clinic for his annual physical.  He has no major concerns or issues at this time.  Patient has had some issues with idiopathic hypersomnolence, and he has been followed closely in the sleep clinic.  He does take Provigil 200 mg for management of this issue.  He has been on this medication for quite some time, and he has found it to be quite helpful.  Patient also takes valacyclovir 500 mg daily for suppressive therapy for HSV.  Patient reports that this medication has kept flareups and skin lesions under good control.  He cannot recall the last time he had any dermatologic issues.  Patient reports a stable appetite.  He is stooling and voiding without issue.  He is not fasting today.    Healthy Habits:     Getting at least 3 servings of Calcium per day:  Yes    Bi-annual eye exam:  Yes    Dental care twice a year:  Yes    Sleep apnea or symptoms of sleep apnea:  Daytime drowsiness    Diet:  Regular (no restrictions)    Frequency of exercise:  2-3 days/week    Duration of exercise:  45-60 minutes    Taking medications regularly:  Yes    Medication side effects:  Not applicable    Additional concerns today:  No      Have you ever done Advance Care Planning? (For example, a Health Directive, POLST, or a discussion with a medical provider or your loved ones about your wishes): No, advance care planning information given to patient to review.  Patient declined advance care planning discussion at this time.    Social History     Tobacco Use    Smoking status: Never    Smokeless tobacco: Never   Substance Use Topics    Alcohol use: Yes     Comment: twice a month           11/16/2023     2:08 PM   Alcohol Use   Prescreen: >3 drinks/day or >7 drinks/week? No       Last PSA: No results found for: \"PSA\"    Reviewed orders with patient. Reviewed health maintenance and updated orders " "accordingly - Yes  Lab work is in process    Reviewed and updated as needed this visit by clinical staff   Tobacco  Allergies  Meds  Problems  Med Hx  Surg Hx  Fam Hx          Reviewed and updated as needed this visit by Provider   Tobacco  Allergies  Meds  Problems  Med Hx  Surg Hx  Fam Hx             Review of Systems   Constitutional:  Negative for chills and fever.   HENT:  Negative for congestion, ear pain, hearing loss and sore throat.    Eyes:  Negative for pain and visual disturbance.   Respiratory:  Negative for cough and shortness of breath.    Cardiovascular:  Negative for chest pain, palpitations and peripheral edema.   Gastrointestinal:  Negative for abdominal pain, constipation, diarrhea, heartburn, hematochezia and nausea.   Genitourinary:  Negative for dysuria, frequency, genital sores, hematuria, impotence, penile discharge and urgency.   Musculoskeletal:  Negative for arthralgias, joint swelling and myalgias.   Skin:  Negative for rash.   Neurological:  Negative for dizziness, weakness, headaches and paresthesias.   Psychiatric/Behavioral:  Negative for mood changes. The patient is not nervous/anxious.        OBJECTIVE:   Blood pressure 132/78, pulse 81, temperature 98.1  F (36.7  C), resp. rate 18, height 1.727 m (5' 8\"), weight 103.4 kg (228 lb), SpO2 100%.    Physical Exam  Vitals reviewed.   HENT:      Head: Normocephalic and atraumatic.      Right Ear: Tympanic membrane, ear canal and external ear normal.      Left Ear: Tympanic membrane, ear canal and external ear normal.      Mouth/Throat:      Mouth: Mucous membranes are moist.      Pharynx: Oropharynx is clear.   Eyes:      Extraocular Movements: Extraocular movements intact.      Conjunctiva/sclera: Conjunctivae normal.      Pupils: Pupils are equal, round, and reactive to light.   Cardiovascular:      Rate and Rhythm: Normal rate and regular rhythm.      Pulses: Normal pulses.      Heart sounds: Normal heart sounds. "   Pulmonary:      Effort: Pulmonary effort is normal.      Breath sounds: Normal breath sounds.   Abdominal:      General: Bowel sounds are normal.      Palpations: Abdomen is soft.   Musculoskeletal:         General: Normal range of motion.      Cervical back: Normal range of motion and neck supple.   Skin:     General: Skin is warm and dry.      Capillary Refill: Capillary refill takes less than 2 seconds.   Neurological:      General: No focal deficit present.      Mental Status: He is alert and oriented to person, place, and time.     Diagnostic testing: Future lab orders CMP, CBC, FLP, and hepatitis C screening have been placed.    ASSESSMENT/PLAN:   (Z00.00) Annual physical exam  (primary encounter diagnosis)  Comment: At this time, patient does have an unremarkable physical examination.  His repeat blood pressure was noted to be at goal.  We did spend some time discussing appropriate dietary and lifestyle modifications to help keep his weight and blood pressure under good control.  Patient will return at a later time for fasting lab collection.  All health maintenance items were addressed    (Z11.59) Need for hepatitis C screening test  Comment: Hepatitis C Screen Reflex to HCV RNA Quant and         Genotype    (G47.11) Idiopathic hypersomnolence  Comment: Chronic condition.  Followed by sleep medicine.  We will continue Provigil as prescribed.    (B00.9) Herpes simplex virus infection  Comment: At this time, patient will continue suppressive therapy with valacyclovir 500 mg daily.  Side effects valacyclovir use were reviewed.  Patient had no further questions or concerns in this regard.    (Z23) Need for influenza vaccination  Comment: Influenza immunization administered.    (Z13.220) Screening for hyperlipidemia  Comment: Lipid panel reflex to direct LDL Fasting    Patient has been advised of split billing requirements and indicates understanding: Yes      COUNSELING:   Reviewed preventive health  "counseling, as reflected in patient instructions      BMI:   Estimated body mass index is 34.67 kg/m  as calculated from the following:    Height as of this encounter: 1.727 m (5' 8\").    Weight as of this encounter: 103.4 kg (228 lb).   Weight management plan: Discussed healthy diet and exercise guidelines      He reports that he has never smoked. He has never used smokeless tobacco.      Everardo Cain MD  [facility]  "

## 2023-11-27 ENCOUNTER — LAB (OUTPATIENT)
Dept: LAB | Facility: CLINIC | Age: 28
End: 2023-11-27
Payer: COMMERCIAL

## 2023-11-27 DIAGNOSIS — Z13.220 SCREENING FOR HYPERLIPIDEMIA: ICD-10-CM

## 2023-11-27 DIAGNOSIS — Z11.59 NEED FOR HEPATITIS C SCREENING TEST: ICD-10-CM

## 2023-11-27 DIAGNOSIS — Z00.00 ANNUAL PHYSICAL EXAM: ICD-10-CM

## 2023-11-27 LAB
ALBUMIN SERPL BCG-MCNC: 5 G/DL (ref 3.5–5.2)
ALP SERPL-CCNC: 52 U/L (ref 40–150)
ALT SERPL W P-5'-P-CCNC: 84 U/L (ref 0–70)
ANION GAP SERPL CALCULATED.3IONS-SCNC: 12 MMOL/L (ref 7–15)
AST SERPL W P-5'-P-CCNC: 44 U/L (ref 0–45)
BASOPHILS # BLD AUTO: 0 10E3/UL (ref 0–0.2)
BASOPHILS NFR BLD AUTO: 0 %
BILIRUB SERPL-MCNC: 0.3 MG/DL
BUN SERPL-MCNC: 13.5 MG/DL (ref 6–20)
CALCIUM SERPL-MCNC: 10.2 MG/DL (ref 8.6–10)
CHLORIDE SERPL-SCNC: 102 MMOL/L (ref 98–107)
CHOLEST SERPL-MCNC: 189 MG/DL
CREAT SERPL-MCNC: 0.88 MG/DL (ref 0.67–1.17)
DEPRECATED HCO3 PLAS-SCNC: 28 MMOL/L (ref 22–29)
EGFRCR SERPLBLD CKD-EPI 2021: >90 ML/MIN/1.73M2
EOSINOPHIL # BLD AUTO: 0.1 10E3/UL (ref 0–0.7)
EOSINOPHIL NFR BLD AUTO: 1 %
ERYTHROCYTE [DISTWIDTH] IN BLOOD BY AUTOMATED COUNT: 12.8 % (ref 10–15)
GLUCOSE SERPL-MCNC: 94 MG/DL (ref 70–99)
HCT VFR BLD AUTO: 43.7 % (ref 40–53)
HDLC SERPL-MCNC: 64 MG/DL
HGB BLD-MCNC: 15 G/DL (ref 13.3–17.7)
IMM GRANULOCYTES # BLD: 0 10E3/UL
IMM GRANULOCYTES NFR BLD: 0 %
LDLC SERPL CALC-MCNC: 110 MG/DL
LYMPHOCYTES # BLD AUTO: 1.8 10E3/UL (ref 0.8–5.3)
LYMPHOCYTES NFR BLD AUTO: 32 %
MCH RBC QN AUTO: 29.6 PG (ref 26.5–33)
MCHC RBC AUTO-ENTMCNC: 34.3 G/DL (ref 31.5–36.5)
MCV RBC AUTO: 86 FL (ref 78–100)
MONOCYTES # BLD AUTO: 0.4 10E3/UL (ref 0–1.3)
MONOCYTES NFR BLD AUTO: 7 %
NEUTROPHILS # BLD AUTO: 3.3 10E3/UL (ref 1.6–8.3)
NEUTROPHILS NFR BLD AUTO: 60 %
NONHDLC SERPL-MCNC: 125 MG/DL
PLATELET # BLD AUTO: 238 10E3/UL (ref 150–450)
POTASSIUM SERPL-SCNC: 4.3 MMOL/L (ref 3.4–5.3)
PROT SERPL-MCNC: 7.9 G/DL (ref 6.4–8.3)
RBC # BLD AUTO: 5.06 10E6/UL (ref 4.4–5.9)
SODIUM SERPL-SCNC: 142 MMOL/L (ref 135–145)
TRIGL SERPL-MCNC: 74 MG/DL
WBC # BLD AUTO: 5.5 10E3/UL (ref 4–11)

## 2023-11-27 PROCEDURE — 80053 COMPREHEN METABOLIC PANEL: CPT

## 2023-11-27 PROCEDURE — 86803 HEPATITIS C AB TEST: CPT

## 2023-11-27 PROCEDURE — 36415 COLL VENOUS BLD VENIPUNCTURE: CPT

## 2023-11-27 PROCEDURE — 85025 COMPLETE CBC W/AUTO DIFF WBC: CPT

## 2023-11-27 PROCEDURE — 80061 LIPID PANEL: CPT

## 2023-11-28 LAB — HCV AB SERPL QL IA: NONREACTIVE

## 2024-01-03 DIAGNOSIS — G47.11 IDIOPATHIC HYPERSOMNOLENCE: ICD-10-CM

## 2024-01-03 DIAGNOSIS — G47.10 HYPERSOMNOLENCE: ICD-10-CM

## 2024-01-03 RX ORDER — MODAFINIL 200 MG/1
200 TABLET ORAL DAILY
Qty: 30 TABLET | Refills: 0 | Status: SHIPPED | OUTPATIENT
Start: 2024-01-03 | End: 2024-02-05

## 2024-01-03 NOTE — TELEPHONE ENCOUNTER
Refills have been requested for the following medications:         modafinil (PROVIGIL) 200 MG tablet [Leta Warner]       Patient Comment: I tried to fill prescription of the 200 mg and the 100mg and the pharmacy said there was only a 100mg prescription and zero 200mg. Can we have more 200mg and 100mg prescriptions processed and sent to pharmacy?         modafinil (PROVIGIL) 200 MG tablet [Leta Warner]       Patient Comment: Same as above     Preferred pharmacy: [id number] [id number]  [address]         200 mg sent 12/15/23. Writer called pharmacy,spoke with pharmacist. They do not have 200 mg prescription on file    Pharmacist also said last dispense for both 100 and 200 mg was 11/27.  shows 100 mg was dispensed 12/31/23.    Prescription for 200 mg pended      Noy JANSEN RN  [facility]

## 2024-01-16 ENCOUNTER — E-VISIT (OUTPATIENT)
Dept: INTERNAL MEDICINE | Facility: CLINIC | Age: 29
End: 2024-01-16
Payer: COMMERCIAL

## 2024-01-16 DIAGNOSIS — R19.7 DIARRHEA, UNSPECIFIED TYPE: Primary | ICD-10-CM

## 2024-01-16 PROCEDURE — 99421 OL DIG E/M SVC 5-10 MIN: CPT | Performed by: INTERNAL MEDICINE

## 2024-01-25 DIAGNOSIS — G47.10 HYPERSOMNOLENCE: ICD-10-CM

## 2024-01-25 DIAGNOSIS — G47.11 IDIOPATHIC HYPERSOMNOLENCE: ICD-10-CM

## 2024-01-25 RX ORDER — MODAFINIL 100 MG/1
100 TABLET ORAL
Qty: 30 TABLET | Refills: 0 | Status: SHIPPED | OUTPATIENT
Start: 2024-03-29 | End: 2024-04-19

## 2024-01-25 RX ORDER — MODAFINIL 100 MG/1
100 TABLET ORAL
Qty: 30 TABLET | Refills: 0 | Status: SHIPPED | OUTPATIENT
Start: 2024-01-30 | End: 2024-04-19

## 2024-01-25 RX ORDER — MODAFINIL 100 MG/1
100 TABLET ORAL
Qty: 30 TABLET | Refills: 0 | Status: SHIPPED | OUTPATIENT
Start: 2024-02-28 | End: 2024-04-19

## 2024-01-25 NOTE — TELEPHONE ENCOUNTER
Pending Prescriptions:                       Disp   Refills    modafinil (PROVIGIL) 100 MG tablet        30 tab*0            Sig: Take 1 tablet (100 mg) by mouth daily (with           lunch)    modafinil (PROVIGIL) 100 MG tablet        30 tab*0            Sig: Take 1 tablet (100 mg) by mouth daily (with           lunch)    modafinil (PROVIGIL) 100 MG tablet        30 tab*0            Sig: Take 1 tablet (100 mg) by mouth daily (with           lunch)          Last Written Prescription Date:  Per  filled 12/31/23  Last Fill Quantity: 30,   # refills: 0  Last Office Visit: 9/28/23  Future Office visit:   3/28/24    Routing refill request to provider for review/approval because:  Drug not on the [facility], P or [facility] refill protocol or controlled substance    Modafinil 200 mg due 2/12/24    Noy JANSEN RN  [facility]

## 2024-02-05 DIAGNOSIS — G47.10 HYPERSOMNOLENCE: ICD-10-CM

## 2024-02-05 DIAGNOSIS — G47.11 IDIOPATHIC HYPERSOMNOLENCE: ICD-10-CM

## 2024-02-05 RX ORDER — MODAFINIL 200 MG/1
200 TABLET ORAL DAILY
Qty: 30 TABLET | Refills: 0 | Status: SHIPPED | OUTPATIENT
Start: 2024-02-12 | End: 2024-04-19

## 2024-02-05 RX ORDER — MODAFINIL 200 MG/1
200 TABLET ORAL DAILY
Qty: 30 TABLET | Refills: 0 | Status: SHIPPED | OUTPATIENT
Start: 2024-04-12 | End: 2024-04-19

## 2024-02-05 RX ORDER — MODAFINIL 200 MG/1
200 TABLET ORAL DAILY
Qty: 30 TABLET | Refills: 0 | Status: SHIPPED | OUTPATIENT
Start: 2024-03-13 | End: 2024-04-19

## 2024-02-05 NOTE — TELEPHONE ENCOUNTER
Pending Prescriptions:                       Disp   Refills    modafinil (PROVIGIL) 200 MG tablet        30 tab*0            Sig: Take 1 tablet (200 mg) by mouth daily    modafinil (PROVIGIL) 200 MG tablet        30 tab*0            Sig: Take 1 tablet (200 mg) by mouth daily    modafinil (PROVIGIL) 200 MG tablet        30 tab*0            Sig: Take 1 tablet (200 mg) by mouth daily         Last Written Prescription Date:  1/3/24  Last Fill Quantity: 30,   # refills: 0  Last Office Visit: 9/28/23  Future Office visit:   3/28/24    Routing refill request to provider for review/approval because:  Drug not on the G, P or [facility] refill protocol or controlled substance    Noy JANSEN RN  [facility]

## 2024-03-28 ENCOUNTER — OFFICE VISIT (OUTPATIENT)
Dept: SLEEP MEDICINE | Facility: CLINIC | Age: 29
End: 2024-03-28
Payer: COMMERCIAL

## 2024-03-28 VITALS
SYSTOLIC BLOOD PRESSURE: 124 MMHG | OXYGEN SATURATION: 95 % | WEIGHT: 225.6 LBS | RESPIRATION RATE: 12 BRPM | BODY MASS INDEX: 34.19 KG/M2 | HEIGHT: 68 IN | DIASTOLIC BLOOD PRESSURE: 71 MMHG | HEART RATE: 65 BPM

## 2024-03-28 DIAGNOSIS — G47.11 IDIOPATHIC HYPERSOMNOLENCE: Primary | ICD-10-CM

## 2024-03-28 PROCEDURE — 99213 OFFICE O/P EST LOW 20 MIN: CPT | Performed by: PHYSICIAN ASSISTANT

## 2024-03-28 ASSESSMENT — SLEEP AND FATIGUE QUESTIONNAIRES
HOW LIKELY ARE YOU TO NOD OFF OR FALL ASLEEP WHILE SITTING INACTIVE IN A PUBLIC PLACE: SLIGHT CHANCE OF DOZING
HOW LIKELY ARE YOU TO NOD OFF OR FALL ASLEEP WHILE WATCHING TV: MODERATE CHANCE OF DOZING
HOW LIKELY ARE YOU TO NOD OFF OR FALL ASLEEP WHILE SITTING AND TALKING TO SOMEONE: SLIGHT CHANCE OF DOZING
HOW LIKELY ARE YOU TO NOD OFF OR FALL ASLEEP IN A CAR, WHILE STOPPED FOR A FEW MINUTES IN TRAFFIC: SLIGHT CHANCE OF DOZING
HOW LIKELY ARE YOU TO NOD OFF OR FALL ASLEEP WHILE LYING DOWN TO REST IN THE AFTERNOON WHEN CIRCUMSTANCES PERMIT: HIGH CHANCE OF DOZING
HOW LIKELY ARE YOU TO NOD OFF OR FALL ASLEEP WHEN YOU ARE A PASSENGER IN A CAR FOR AN HOUR WITHOUT A BREAK: MODERATE CHANCE OF DOZING
HOW LIKELY ARE YOU TO NOD OFF OR FALL ASLEEP WHILE SITTING AND READING: HIGH CHANCE OF DOZING
HOW LIKELY ARE YOU TO NOD OFF OR FALL ASLEEP WHILE SITTING QUIETLY AFTER LUNCH WITHOUT ALCOHOL: SLIGHT CHANCE OF DOZING

## 2024-03-28 NOTE — PROGRESS NOTES
[facility]   Outpatient Sleep Medicine  Mar 28, 2024       Name: Sage Aburto MRN# [id number]   Age: 28 year old YOB: 1995            Assessment and Plan:   1. Idiopathic hypersomnolence  Doing very well on modafinil 300mg daily split TID dosing. No significant side effects and effective in managing his sleepiness. Will keep him at this dose and encouraged him to continue drug holidays. Follow-up 6 months for routine med check.          Chief Complaint      Chief Complaint   Patient presents with    Medication Follow-up          History of Present Illness:     Sage Aburto is a 28 year old male who presents to the clinic for follow-up of idiopathic hypersomnolence treated with modafinil 300mg.      Summary of hypersomnolence testing completed 5/2022:  Pre-PSG evaluation showed relatively consistent sleep intake of approximately 7-8 hours from 10:30-11:00PM to roughly 6:30AM.      PSG was unrevealing. No evidence of significant sleep disordered breathing with AHI 2.0, RDI 3.5.  No sustained hypoxemia.  No evidence of sleep-related movement disorder, few PLM's with index 4.2, arousal at 1.9. Normal sinus rhythm on cardiac monitoring.  Sleep architecture revealed all sleep stages present with normal duration and normal sleep efficiency 95%.  Sleep latency was decreased at 5 minutes.  REM latency 143.5 minutes.     MSLT completed following day consisted of 4 naps.  Mean sleep latency during these naps 5.8 minutes (nap one 15.1 minutes, nap two 2 minutes, nap three 1.9 minute, nap four 4.5 minutes).  No sleep onset REM.     Urine drug screen negative.      Following this testing we re-started on modafinil 200mg. Was previously prescribed modafinil by Bennett Goltz PAC in the past for sleepiness with DASHAWN and tolerated well.      Visit 7/2022 was doing well on modafinil 200mg daily, no longer falling asleep in social settings but still sleepy during the day (ESS 17, improved  "from 21). Agreed to addition of 100mg tablet to be added in late morning/early afternoon.      Visit 3/2023 things were going \"really well\" on modafinil 300mg, taking 200mg in AM about 30 minutes after waking and 100mg 12-1:00PM with lunch. Feels extra dose later is helpful to get through rest of the day. No insomnia with higher dose, no increase in headaches, no other side effects.     Last visit 9/2023 doing well without side effects on modafinil 300mg daily with 200mg in AM after waking and additional 100mg tablet in afternoon but worried was building tolerance, not as helpful as used to be. Discussed drug holidays and agreed to split his dose to TID with 100mg taken around 6AM when wakes up, another 100mg around 10AM and last 100mg dose around 1-2:00PM if needed.     Today - Happy with TID dosing. No insomnia problems or other issues with timing of dose. Takes weekend off once a month (full Friday, Saturday, Sunday without any) and \"I can definitely tell the next week it's working better\". Sleepiness improved with TID dosing, \"definitely better than it was\". Caffeine most days but max 200mg a day. Will have occasional tired day without clear trigger but typically well controlled. Sleep schedule: Bedtime 9-10PM with <10 minute sleep latency and wake time 5-5:30AM.  No napping during the week but on weekends may take 20-30 minute nap and does find helpful.      SCALES:   INSOMNIA: Insomnia Severity Score: 11   SLEEPINESS: [place] Sleepiness Score: 14    Past medical/surgical history, family history, social history, medications and allergies were reviewed.           Physical Examination:   /71   Pulse 65   Resp 12   Ht 1.727 m (5' 8\")   Wt 102.3 kg (225 lb 9.6 oz)   SpO2 95%   BMI 34.30 kg/m    General appearance: Awake, alert, cooperative. Well groomed. Sitting comfortably in chair. In no apparent distress.  HEENT: Head: Normocephalic, atraumatic. Eyes:Conjunctiva clear. Sclera normal. Nose: External " appearance without deformity.   Pulmonary:  Able to speak easily in full sentences. No cough or wheeze.   Skin:  No rashes or significant lesions on visible skin.   Neurologic: Alert, oriented x3.   Psychiatric: Mood euthymic. Affect congruent with full range and intensity.      CC:  Anayeli Mejia PA-C  Mar 28, 2024     [facility]  [address] [place] [address]     [facility]  [address] [address]    Chart documentation was completed, in part, with [employer] voice-recognition software. Even though reviewed, some grammatical, spelling, and word errors may remain.    20 minutes spent on day of encounter doing chart review, history and exam, documentation, and further activities as noted above

## 2024-03-28 NOTE — NURSING NOTE
"Chief Complaint   Patient presents with    Medication Follow-up       Initial /71   Pulse 65   Resp 12   Ht 1.727 m (5' 8\")   Wt 102.3 kg (225 lb 9.6 oz)   SpO2 95%   BMI 34.30 kg/m   Estimated body mass index is 34.3 kg/m  as calculated from the following:    Height as of this encounter: 1.727 m (5' 8\").    Weight as of this encounter: 102.3 kg (225 lb 9.6 oz).    Medication Reconciliation: complete  ESS: 14  Neck circumference: 16 inches / 40 centimeters.  DME: N/A  Ann Morales CMA      "

## 2024-04-19 DIAGNOSIS — G47.11 IDIOPATHIC HYPERSOMNOLENCE: ICD-10-CM

## 2024-04-19 DIAGNOSIS — G47.10 HYPERSOMNOLENCE: ICD-10-CM

## 2024-04-19 RX ORDER — MODAFINIL 100 MG/1
100 TABLET ORAL
Qty: 30 TABLET | Refills: 0 | Status: SHIPPED | OUTPATIENT
Start: 2024-05-05 | End: 2024-08-01

## 2024-04-19 RX ORDER — MODAFINIL 100 MG/1
100 TABLET ORAL
Qty: 30 TABLET | Refills: 0 | Status: CANCELLED | OUTPATIENT
Start: 2024-06-21

## 2024-04-19 RX ORDER — MODAFINIL 200 MG/1
200 TABLET ORAL DAILY
Qty: 30 TABLET | Refills: 0 | Status: SHIPPED | OUTPATIENT
Start: 2024-06-21 | End: 2024-08-01

## 2024-04-19 RX ORDER — MODAFINIL 200 MG/1
200 TABLET ORAL DAILY
Qty: 30 TABLET | Refills: 0 | Status: SHIPPED | OUTPATIENT
Start: 2024-04-22 | End: 2024-08-01

## 2024-04-19 RX ORDER — MODAFINIL 100 MG/1
100 TABLET ORAL
Qty: 30 TABLET | Refills: 0 | Status: SHIPPED | OUTPATIENT
Start: 2024-07-04 | End: 2024-08-01

## 2024-04-19 RX ORDER — MODAFINIL 100 MG/1
100 TABLET ORAL
Qty: 30 TABLET | Refills: 0 | Status: SHIPPED | OUTPATIENT
Start: 2024-06-04 | End: 2024-08-01

## 2024-04-19 RX ORDER — MODAFINIL 200 MG/1
200 TABLET ORAL DAILY
Qty: 30 TABLET | Refills: 0 | Status: SHIPPED | OUTPATIENT
Start: 2024-05-22 | End: 2024-08-01

## 2024-04-19 RX ORDER — MODAFINIL 100 MG/1
100 TABLET ORAL
Qty: 30 TABLET | Refills: 0 | Status: CANCELLED | OUTPATIENT
Start: 2024-05-22

## 2024-04-19 RX ORDER — MODAFINIL 100 MG/1
100 TABLET ORAL
Qty: 30 TABLET | Refills: 0 | Status: CANCELLED | OUTPATIENT
Start: 2024-04-22

## 2024-04-19 NOTE — TELEPHONE ENCOUNTER
Pending Prescriptions:                       Disp   Refills    modafinil (PROVIGIL) 200 MG tablet        30 tab*0            Sig: Take 1 tablet (200 mg) by mouth daily    modafinil (PROVIGIL) 200 MG tablet        30 tab*0            Sig: Take 1 tablet (200 mg) by mouth daily    modafinil (PROVIGIL) 200 MG tablet        30 tab*0            Sig: Take 1 tablet (200 mg) by mouth daily    modafinil (PROVIGIL) 100 MG tablet        30 tab*0            Sig: Take 1 tablet (100 mg) by mouth daily (with           lunch)    modafinil (PROVIGIL) 100 MG tablet        30 tab*0            Sig: Take 1 tablet (100 mg) by mouth daily (with           lunch)    modafinil (PROVIGIL) 100 MG tablet        30 tab*0            Sig: Take 1 tablet (100 mg) by mouth daily (with           lunch)          Last Written Prescription Date:  1/25/24, 2/5/24  Last Fill Quantity: 30, 30   # refills: 0  Last Office Visit: 3/28/24  Future Office visit: 9/27/24      Routing refill request to provider for review/approval because:  Drug not on the [facility], P or [facility] refill protocol or controlled substance

## 2024-06-25 ENCOUNTER — DOCUMENTATION ONLY (OUTPATIENT)
Dept: INTERNAL MEDICINE | Facility: CLINIC | Age: 29
End: 2024-06-25
Payer: COMMERCIAL

## 2024-06-25 DIAGNOSIS — Z00.00 ANNUAL PHYSICAL EXAM: Primary | ICD-10-CM

## 2024-06-25 LAB
ABO/RH(D): NORMAL
ANTIBODY SCREEN: NEGATIVE
SPECIMEN EXPIRATION DATE: NORMAL

## 2024-06-25 NOTE — PROGRESS NOTES
"Patient has lab only appt Tomorrow, Wednesday, 6/26/24 for \"ABO grouping and Rh typing\", no orders in chart.  Please place FUTURE orders in Epic if appropriate.    Thanks,   [facility]    "

## 2024-06-26 ENCOUNTER — LAB (OUTPATIENT)
Dept: LAB | Facility: CLINIC | Age: 29
End: 2024-06-26
Payer: COMMERCIAL

## 2024-06-26 DIAGNOSIS — Z00.00 ANNUAL PHYSICAL EXAM: ICD-10-CM

## 2024-06-26 PROCEDURE — 86900 BLOOD TYPING SEROLOGIC ABO: CPT

## 2024-06-26 PROCEDURE — 36415 COLL VENOUS BLD VENIPUNCTURE: CPT

## 2024-06-26 PROCEDURE — 86901 BLOOD TYPING SEROLOGIC RH(D): CPT

## 2024-06-26 PROCEDURE — 86850 RBC ANTIBODY SCREEN: CPT

## 2024-08-01 DIAGNOSIS — G47.10 HYPERSOMNOLENCE: ICD-10-CM

## 2024-08-01 DIAGNOSIS — G47.11 IDIOPATHIC HYPERSOMNOLENCE: ICD-10-CM

## 2024-08-01 RX ORDER — MODAFINIL 100 MG/1
100 TABLET ORAL
Qty: 30 TABLET | Refills: 0 | Status: SHIPPED | OUTPATIENT
Start: 2024-08-08

## 2024-08-01 RX ORDER — MODAFINIL 200 MG/1
200 TABLET ORAL DAILY
Qty: 30 TABLET | Refills: 0 | Status: SHIPPED | OUTPATIENT
Start: 2024-08-01

## 2024-08-01 RX ORDER — MODAFINIL 200 MG/1
200 TABLET ORAL DAILY
Qty: 30 TABLET | Refills: 0 | Status: SHIPPED | OUTPATIENT
Start: 2024-08-08

## 2024-08-01 RX ORDER — MODAFINIL 100 MG/1
100 TABLET ORAL
Qty: 30 TABLET | Refills: 0 | Status: SHIPPED | OUTPATIENT
Start: 2024-10-06

## 2024-08-01 RX ORDER — MODAFINIL 200 MG/1
200 TABLET ORAL DAILY
Qty: 30 TABLET | Refills: 0 | Status: SHIPPED | OUTPATIENT
Start: 2024-09-07

## 2024-08-01 RX ORDER — MODAFINIL 100 MG/1
100 TABLET ORAL
Qty: 30 TABLET | Refills: 0 | Status: SHIPPED | OUTPATIENT
Start: 2024-09-07

## 2024-08-01 NOTE — TELEPHONE ENCOUNTER
Pending Prescriptions:                       Disp   Refills    modafinil (PROVIGIL) 200 MG tablet        30 tab*0            Sig: Take 1 tablet (200 mg) by mouth daily    modafinil (PROVIGIL) 100 MG tablet        30 tab*0            Sig: Take 1 tablet (100 mg) by mouth daily (with           lunch)    modafinil (PROVIGIL) 200 MG tablet        30 tab*0            Sig: Take 1 tablet (200 mg) by mouth daily    modafinil (PROVIGIL) 100 MG tablet        30 tab*0            Sig: Take 1 tablet (100 mg) by mouth daily (with           lunch)    modafinil (PROVIGIL) 200 MG tablet        30 tab*0            Sig: Take 1 tablet (200 mg) by mouth daily    modafinil (PROVIGIL) 100 MG tablet        30 tab*0            Sig: Take 1 tablet (100 mg) by mouth daily (with           lunch)          Last Written Prescription Date:  7/9/24  Last Fill Quantity: 30,   # refills: 0  Last Office Visit: 3/28/24  Future Office visit:   9/27/24    Routing refill request to provider for review/approval because:  Drug not on the [facility], P or [facility] refill protocol or controlled substance

## 2024-09-27 ENCOUNTER — OFFICE VISIT (OUTPATIENT)
Dept: SLEEP MEDICINE | Facility: CLINIC | Age: 29
End: 2024-09-27
Payer: COMMERCIAL

## 2024-09-27 VITALS
WEIGHT: 225 LBS | BODY MASS INDEX: 34.1 KG/M2 | SYSTOLIC BLOOD PRESSURE: 126 MMHG | HEART RATE: 58 BPM | OXYGEN SATURATION: 98 % | DIASTOLIC BLOOD PRESSURE: 81 MMHG | HEIGHT: 68 IN

## 2024-09-27 DIAGNOSIS — G47.11 IDIOPATHIC HYPERSOMNOLENCE: Primary | ICD-10-CM

## 2024-09-27 PROCEDURE — 99213 OFFICE O/P EST LOW 20 MIN: CPT | Performed by: PHYSICIAN ASSISTANT

## 2024-09-27 NOTE — NURSING NOTE
"Chief Complaint   Patient presents with    RECHECK     Medication follow up        Initial /81   Pulse 58   Ht 1.727 m (5' 8\")   Wt 102.1 kg (225 lb)   SpO2 98%   BMI 34.21 kg/m   Estimated body mass index is 34.21 kg/m  as calculated from the following:    Height as of this encounter: 1.727 m (5' 8\").    Weight as of this encounter: 102.1 kg (225 lb).    Medication Reconciliation: complete  ESS 12  VICENTE 14  Daxa Miranda MA      "

## 2024-09-27 NOTE — PROGRESS NOTES
[facility]   Outpatient Sleep Medicine  Sep 27, 2024       Name: Sage Aburto MRN# [id number]   Age: 28 year old YOB: 1995            Assessment and Plan:   1. Idiopathic hypersomnolence  Returns to clinic for 6-month follow-up visit in regards to modafinil.  Continues to do very well with 100 mg taken 3 times a day, tolerating well without significant side effects and does get benefit so agreed to continue at current. Will continue taking drug holidays on weekends.  Once his current prescriptions at the pharmacy are filled agreed that we will discontinue the 200 mg dose and switch him over to only taking 100 mg tabs 3 times daily versus cutting 200 mg dose in half.  He is due for a urine screen and orders were placed today, he is going to get this done at [facility] right next to his house within the next few days.  Follow-up in about 6 months for routine med follow-up visit.  - Urine Drug Screen; Future       Chief Complaint      Chief Complaint   Patient presents with    RECHECK     Medication follow up           History of Present Illness:     Sage Aburto is a 28 year old male who presents to the clinic for follow-up of idiopathic hypersomnolence treated with modafinil 300mg.      Summary of hypersomnolence testing completed 5/2022:  Pre-PSG evaluation showed relatively consistent sleep intake of approximately 7-8 hours from 10:30-11:00PM to roughly 6:30AM.      PSG was unrevealing. No evidence of significant sleep disordered breathing with AHI 2.0, RDI 3.5.  No sustained hypoxemia.  No evidence of sleep-related movement disorder, few PLM's with index 4.2, arousal at 1.9. Normal sinus rhythm on cardiac monitoring.  Sleep architecture revealed all sleep stages present with normal duration and normal sleep efficiency 95%.  Sleep latency was decreased at 5 minutes.  REM latency 143.5 minutes.     MSLT completed following day consisted of 4 naps.  Mean sleep latency  "during these naps 5.8 minutes (nap one 15.1 minutes, nap two 2 minutes, nap three 1.9 minute, nap four 4.5 minutes).  No sleep onset REM.     Urine drug screen negative.     Following this testing we re-started on modafinil 200mg. Was previously prescribed modafinil by Bennett Goltz PAC in the past for sleepiness with DASHAWN and tolerated well.      Visit 7/2022 was doing well on modafinil 200mg daily, no longer falling asleep in social settings but still sleepy during the day (ESS 17, improved from 21). Agreed to addition of 100mg tablet to be added in late morning/early afternoon.      Visit 3/2023 things were going \"really well\" on modafinil 300mg, taking 200mg in AM about 30 minutes after waking and 100mg 12-1:00PM with lunch. Feels extra dose later is helpful to get through rest of the day. No insomnia with higher dose, no increase in headaches, no other side effects.      Visit 9/2023 doing well without side effects on modafinil 300mg daily with 200mg in AM after waking and additional 100mg tablet in afternoon but worried was building tolerance, not as helpful as used to be. Discussed drug holidays and agreed to split his dose to TID with 100mg taken around 6AM when wakes up, another 100mg around 10AM and last 100mg dose around 1-2:00PM if needed.      Visit 3/28/23- Happy with TID dosing. No insomnia problems or other issues with timing of dose. Takes weekend off once a month (full Friday, Saturday, Sunday without any) and \"I can definitely tell the next week it's working better\". Sleepiness improved with TID dosing, \"definitely better than it was\". Caffeine most days but max 200mg a day. Will have occasional tired day without clear trigger but typically well controlled. Sleep schedule: Bedtime 9-10PM with <10 minute sleep latency and wake time 5-5:30AM.  No napping during the week but on weekends may take 20-30 minute nap and does find helpful.      Today -continues to do well with TID dosing.  Current sleep " "schedule includes bedtime around 10:00 PM with wake time 5-5:30 AM on weekdays, weekends may sleep until 7-8 AM.  Takes first dose of modafinil 100 mg around 6 AM, second dose at 10 AM, and third dose at 2:00 PM.  Sometimes he does not take his 2 PM dose if he does not feel he needs it to finish out his day.  Denies significant insomnia problems but sometimes if he takes the 2 PM dose feels quality of sleep is lower, no sleep initiation problems.  He will take modafinil every day during the workweek but generally avoids taking on Saturday and Sunday unless he has specific work that needs to be done that he feels he needs to be alert for.  Generally denies any napping during the week but may nap on the weekends as needed.  Average caffeine is 2 to 3 cups of coffee a day.  Of note, he and his wife are expecting first child due date end of January.    SCALES:   INSOMNIA: Insomnia Severity Score: 14   SLEEPINESS: [place] Sleepiness Score: 12    Past medical/surgical history, family history, social history, medications and allergies were reviewed.           Physical Examination:   /81   Pulse 58   Ht 1.727 m (5' 8\")   Wt 102.1 kg (225 lb)   SpO2 98%   BMI 34.21 kg/m    General appearance: Awake, alert, cooperative. Well groomed. Sitting comfortably in chair. In no apparent distress.  HEENT: Head: Normocephalic, atraumatic. Eyes:Conjunctiva clear. Sclera normal. Nose: External appearance without deformity.   Pulmonary:  Able to speak easily in full sentences. No cough or wheeze.   Skin:  No rashes or significant lesions on visible skin.   Neurologic: Alert, oriented x3.   Psychiatric: Mood euthymic. Affect congruent with full range and intensity.      CC:  Everardo aCin PA-C  Sep 27, 2024     [facility]  [address] , [place] [address]     [facility]  [address] [place] [address], [place] [address]    Chart documentation " was completed, in part, with [employer] voice-recognition software. Even though reviewed, some grammatical, spelling, and word errors may remain.    24 minutes spent on day of encounter doing chart review, history and exam, documentation, and further activities as noted above

## 2024-09-28 NOTE — TELEPHONE ENCOUNTER
I believe he has been using [facility] to fill this script because of denial in the past for coverage, but I believe a prior auth was done in 2022 and it was covered looking over past visit notes. Mind calling him to see if he is still ok using [facility] or if he wants us to appeal? 5

## 2024-10-18 ENCOUNTER — PATIENT OUTREACH (OUTPATIENT)
Dept: CARE COORDINATION | Facility: CLINIC | Age: 29
End: 2024-10-18
Payer: COMMERCIAL

## 2024-11-01 ENCOUNTER — PATIENT OUTREACH (OUTPATIENT)
Dept: CARE COORDINATION | Facility: CLINIC | Age: 29
End: 2024-11-01
Payer: COMMERCIAL

## 2024-11-13 DIAGNOSIS — G47.11 IDIOPATHIC HYPERSOMNOLENCE: ICD-10-CM

## 2024-11-13 DIAGNOSIS — G47.10 HYPERSOMNOLENCE: ICD-10-CM

## 2024-11-13 RX ORDER — MODAFINIL 200 MG/1
200 TABLET ORAL DAILY
Qty: 30 TABLET | Refills: 2 | Status: SHIPPED | OUTPATIENT
Start: 2024-11-13

## 2024-11-13 RX ORDER — MODAFINIL 100 MG/1
100 TABLET ORAL
Qty: 30 TABLET | Refills: 2 | Status: SHIPPED | OUTPATIENT
Start: 2024-11-13

## 2024-11-13 NOTE — TELEPHONE ENCOUNTER
Pending Prescriptions:                       Disp   Refills    modafinil (PROVIGIL) 100 MG tablet        30 tab*2            Sig: Take 1 tablet (100 mg) by mouth daily (with           lunch).    modafinil (PROVIGIL) 200 MG tablet        30 tab*2            Sig: Take 1 tablet (200 mg) by mouth daily.          Last Written Prescription Date:  10/21/24  Last Fill Quantity: 30,30,   # refills: 0,0  Last Office Visit: 9/27/24  Future Office visit:   4/4/25    Routing refill request to provider for review/approval because:  Drug not on the [facility], P or [facility] refill protocol or controlled substance    Noy JANSEN RN  [facility]

## 2024-12-22 ENCOUNTER — HEALTH MAINTENANCE LETTER (OUTPATIENT)
Age: 29
End: 2024-12-22

## 2025-01-07 NOTE — ADDENDUM NOTE
Addended by: RICHARD LONG on: 12/15/2022 03:42 PM     Modules accepted: Orders     Patient discharged to home with spouse, transported by spouse with all belongings. Reviewed discharge paperwork with patient and spouse, all questions answered. IV removed.

## 2025-03-10 DIAGNOSIS — G47.11 IDIOPATHIC HYPERSOMNOLENCE: ICD-10-CM

## 2025-03-10 DIAGNOSIS — G47.10 HYPERSOMNOLENCE: ICD-10-CM

## 2025-03-10 RX ORDER — MODAFINIL 100 MG/1
100 TABLET ORAL
Qty: 30 TABLET | Refills: 2 | Status: SHIPPED | OUTPATIENT
Start: 2025-03-10

## 2025-03-10 RX ORDER — MODAFINIL 200 MG/1
200 TABLET ORAL DAILY
Qty: 30 TABLET | Refills: 2 | Status: SHIPPED | OUTPATIENT
Start: 2025-03-10

## 2025-03-10 NOTE — TELEPHONE ENCOUNTER
Pending Prescriptions:                       Disp   Refills    modafinil (PROVIGIL) 200 MG tablet        30 tab*2            Sig: Take 1 tablet (200 mg) by mouth daily.    modafinil (PROVIGIL) 100 MG tablet        30 tab*2            Sig: Take 1 tablet (100 mg) by mouth daily (with           lunch).          Last Written Prescription Date:  11/13/24  Last Fill Quantity: 30   # refills: 2  Last Office Visit: 9/27/25   Future Office visit:  4/4/25     Routing refill request to provider for review/approval because:  Drug not on the [facility], P or [facility] refill protocol or controlled substance

## 2025-03-24 ENCOUNTER — VIRTUAL VISIT (OUTPATIENT)
Dept: SLEEP MEDICINE | Facility: CLINIC | Age: 30
End: 2025-03-24
Payer: COMMERCIAL

## 2025-03-24 VITALS — HEIGHT: 69 IN | WEIGHT: 220 LBS | BODY MASS INDEX: 32.58 KG/M2

## 2025-03-24 DIAGNOSIS — G47.11 IDIOPATHIC HYPERSOMNOLENCE: Primary | ICD-10-CM

## 2025-03-24 PROCEDURE — 1126F AMNT PAIN NOTED NONE PRSNT: CPT | Mod: 95 | Performed by: PHYSICIAN ASSISTANT

## 2025-03-24 PROCEDURE — 98005 SYNCH AUDIO-VIDEO EST LOW 20: CPT | Performed by: PHYSICIAN ASSISTANT

## 2025-03-24 ASSESSMENT — PAIN SCALES - GENERAL: PAINLEVEL_OUTOF10: NO PAIN (0)

## 2025-03-24 ASSESSMENT — SLEEP AND FATIGUE QUESTIONNAIRES
HOW LIKELY ARE YOU TO NOD OFF OR FALL ASLEEP WHILE WATCHING TV: MODERATE CHANCE OF DOZING
HOW LIKELY ARE YOU TO NOD OFF OR FALL ASLEEP WHEN YOU ARE A PASSENGER IN A CAR FOR AN HOUR WITHOUT A BREAK: HIGH CHANCE OF DOZING
HOW LIKELY ARE YOU TO NOD OFF OR FALL ASLEEP WHILE SITTING AND TALKING TO SOMEONE: SLIGHT CHANCE OF DOZING
HOW LIKELY ARE YOU TO NOD OFF OR FALL ASLEEP WHILE SITTING QUIETLY AFTER LUNCH WITHOUT ALCOHOL: SLIGHT CHANCE OF DOZING
HOW LIKELY ARE YOU TO NOD OFF OR FALL ASLEEP WHILE SITTING INACTIVE IN A PUBLIC PLACE: MODERATE CHANCE OF DOZING
HOW LIKELY ARE YOU TO NOD OFF OR FALL ASLEEP WHILE LYING DOWN TO REST IN THE AFTERNOON WHEN CIRCUMSTANCES PERMIT: MODERATE CHANCE OF DOZING
HOW LIKELY ARE YOU TO NOD OFF OR FALL ASLEEP IN A CAR, WHILE STOPPED FOR A FEW MINUTES IN TRAFFIC: SLIGHT CHANCE OF DOZING
HOW LIKELY ARE YOU TO NOD OFF OR FALL ASLEEP WHILE SITTING AND READING: MODERATE CHANCE OF DOZING

## 2025-03-24 NOTE — PROGRESS NOTES
Virtual Visit Details    Type of service:  Video Visit   Video start 1:54PM  Video end 2:05PM  Originating Location (pt. Location): Parked in car    Distant Location (provider location):  Off-site  Platform used for Video Visit: [facility]   Outpatient Sleep Medicine  Mar 24, 2025       Name: Sage Aburto MRN# [id number]   Age: 29 year old YOB: 1995            Assessment and Plan:   1. Idiopathic hypersomnolence (Primary)  Continues to do well on modafinil 300mg daily taken TID with some holidays on weekends.  Tolerating well without significant side effects.  Agreed to continue at current dose.  She will get urine drug screen at his earliest convenience, orders placed last visit should still be valid.  Follow-up in about 6 months for recheck.         Chief Complaint      Chief Complaint   Patient presents with    RECHECK            History of Present Illness:     Sage Aburto is a 28 year old male who presents to the clinic for follow-up of idiopathic hypersomnolence treated with modafinil 300mg.      Summary of hypersomnolence testing completed 5/2022:  Pre-PSG evaluation showed relatively consistent sleep intake of approximately 7-8 hours from 10:30-11:00PM to roughly 6:30AM.      PSG was unrevealing. No evidence of significant sleep disordered breathing with AHI 2.0, RDI 3.5.  No sustained hypoxemia.  No evidence of sleep-related movement disorder, few PLM's with index 4.2, arousal at 1.9. Normal sinus rhythm on cardiac monitoring.  Sleep architecture revealed all sleep stages present with normal duration and normal sleep efficiency 95%.  Sleep latency was decreased at 5 minutes.  REM latency 143.5 minutes.     MSLT completed following day consisted of 4 naps.  Mean sleep latency during these naps 5.8 minutes (nap one 15.1 minutes, nap two 2 minutes, nap three 1.9 minute, nap four 4.5 minutes).  No sleep onset REM.     Urine drug screen negative.     "  Following this testing we re-started on modafinil 200mg. Was previously prescribed modafinil by Bennett Goltz PAC in the past for sleepiness with DASHAWN and tolerated well.      Visit 7/2022 was doing well on modafinil 200mg daily, no longer falling asleep in social settings but still sleepy during the day (ESS 17, improved from 21). Agreed to addition of 100mg tablet to be added in late morning/early afternoon.      Visit 3/2023 things were going \"really well\" on modafinil 300mg, taking 200mg in AM about 30 minutes after waking and 100mg 12-1:00PM with lunch. Feels extra dose later is helpful to get through rest of the day. No insomnia with higher dose, no increase in headaches, no other side effects.      Visit 9/2023 doing well without side effects on modafinil 300mg daily with 200mg in AM after waking and additional 100mg tablet in afternoon but worried was building tolerance, not as helpful as used to be. Discussed drug holidays and agreed to split his dose to TID with 100mg taken around 6AM when wakes up, another 100mg around 10AM and last 100mg dose around 1-2:00PM if needed.      Visit 3/28/23 - Happy with TID dosing. No insomnia problems or other issues with timing of dose. Takes weekend off once a month (full Friday, Saturday, Sunday without any) and \"I can definitely tell the next week it's working better\". Sleepiness improved with TID dosing, \"definitely better than it was\". Caffeine most days but max 200mg a day. Will have occasional tired day without clear trigger but typically well controlled. Sleep schedule: Bedtime 9-10PM with <10 minute sleep latency and wake time 5-5:30AM.  No napping during the week but on weekends may take 20-30 minute nap and does find helpful.       Visit 9/27/24 -Continued to do well with TID dosing and drug holidays on weekends most Sat/Sundays.     Today -no concerns, continues to do well without any side effects or problems.  Had his first daughter in January so sleep is " "little \"all over the place\" but just darted sleeping more through the night and things are getting better.  During the workweek normally goes to bed between 9-9:30 PM and wakes between 5-6 AM.  On weekends bedtime is 10-11:00 PM and wakes 7-8 AM or as late as they can with baby.  Still taking limited doses on the weekends, usually will take 1 in the morning but not the other 2.     SCALES:   INSOMNIA: Insomnia Severity Score: 9   SLEEPINESS: [place] Sleepiness Score: 14    Past medical/surgical history, family history, social history, medications and allergies were reviewed.           Physical Examination:   Ht 1.753 m (5' 9\")   Wt 99.8 kg (220 lb)   BMI 32.49 kg/m    General appearance: Awake, alert, cooperative. Well groomed. Sitting comfortably in chair. In no apparent distress.  HEENT: Head: Normocephalic, atraumatic. Eyes:Conjunctiva clear. Sclera normal. Nose: External appearance without deformity.   Pulmonary:  Able to speak easily in full sentences. No cough or wheeze.   Skin:  No rashes or significant lesions on visible skin.   Neurologic: Alert, oriented x3.   Psychiatric: Mood euthymic. Affect congruent with full range and intensity.      CC:  Everardo Cain PA-C  Mar 24, 2025     [facility]  [address] [place] [address]     [facility]  [address] [address]    Chart documentation was completed, in part, with [employer] voice-recognition software. Even though reviewed, some grammatical, spelling, and word errors may remain.    28 minutes spent on day of encounter doing chart review, history and exam, documentation, and further activities as noted above    "

## 2025-03-24 NOTE — NURSING NOTE
Current patient location:  Work    Is the patient currently in the state of [place]? YES    Visit mode: VIDEO    If the visit is dropped, the patient can be reconnected by:VIDEO VISIT: Text to cell phone:   Telephone Information:   Mobile [phone]       Will anyone else be joining the visit? NO  (If patient encounters technical issues they should call [phone]    Are changes needed to the allergy or medication list? No    Are refills needed on medications prescribed by this physician? NO    Rooming Documentation:  Patient will complete questionnaire(s) in [facility]    Reason for visit: RECHECK    Nandini Patton VVF      
Yes

## 2025-06-18 DIAGNOSIS — G47.11 IDIOPATHIC HYPERSOMNOLENCE: ICD-10-CM

## 2025-06-18 DIAGNOSIS — G47.10 HYPERSOMNOLENCE: ICD-10-CM

## 2025-06-18 RX ORDER — MODAFINIL 200 MG/1
200 TABLET ORAL DAILY
Qty: 30 TABLET | Refills: 2 | Status: SHIPPED | OUTPATIENT
Start: 2025-06-24

## 2025-06-18 RX ORDER — MODAFINIL 100 MG/1
100 TABLET ORAL
Qty: 30 TABLET | Refills: 2 | Status: SHIPPED | OUTPATIENT
Start: 2025-06-24

## 2025-06-18 NOTE — TELEPHONE ENCOUNTER
Pending Prescriptions:                       Disp   Refills    modafinil (PROVIGIL) 100 MG tablet        30 tab*2            Sig: Take 1 tablet (100 mg) by mouth daily (with           lunch).    modafinil (PROVIGIL) 200 MG tablet        30 tab*2            Sig: Take 1 tablet (200 mg) by mouth daily.     reviewed, last fills on 5/25/25      Last Written Prescription Date:  3/10/25  Last Fill Quantity: 30,30,   # refills: 2,2  Last Office Visit: 3/24/25  Future Office visit:   9/24/25    Routing refill request to provider for review/approval because:  Drug not on the [facility], [facility] or [facility] refill protocol or controlled substance